# Patient Record
Sex: FEMALE | Race: WHITE | NOT HISPANIC OR LATINO | Employment: UNEMPLOYED | URBAN - METROPOLITAN AREA
[De-identification: names, ages, dates, MRNs, and addresses within clinical notes are randomized per-mention and may not be internally consistent; named-entity substitution may affect disease eponyms.]

---
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
## 2024-01-04 ENCOUNTER — PATIENT MESSAGE (OUTPATIENT)
Dept: PSYCHIATRY | Facility: CLINIC | Age: 19
End: 2024-01-04
Payer: COMMERCIAL

## 2024-01-09 ENCOUNTER — DOCUMENTATION ONLY (OUTPATIENT)
Dept: PSYCHIATRY | Facility: CLINIC | Age: 19
End: 2024-01-09
Payer: COMMERCIAL

## 2024-01-09 NOTE — PROGRESS NOTES
"Patient reported being diagnosed with ADHD since childhood and wants to reevaluated for ADHD. She stated she was diagnosed with ADHD around age 8 or 9 and was treated successfully with a stimulant.     ADHD ROS        A. A persistent pattern of inattention and/or hyperactivity-impulsivity that interferes with functioning  or development, as characterized by (1) and/or (2):  1. Inattention: Six (or more) of the following symptoms have persisted for at least 6 months to a degree that is inconsistent with developmental level and that negatively impacts directly on social and academic/occupational activities:  Note: The symptoms are not solely a manifestation of oppositional behavior, defiance, hostility, or failure to understand tasks or instructions.      For older adolescents and adults (age 17 and older), at least five symptoms are required:      Patient endorses:   a. Often fails to give close attention to details or makes careless mistakes in schoolwork,  at work, or during other activities (e.g., overlooks or misses details, work is inaccurate). Yes. "I make careless mistakes"  b. Often has difficulty sustaining attention in tasks or play activities (e.g., has difficulty remaining  focused during lectures, conversations, or lengthy reading). Yes, "It 's hard to pay attention. I hear the words and I cannot comprehend what they are saying. I have very short attention with people I tend to interrupt people".   c. Often does not seem to listen when spoken to directly (e.g., mind seems elsewhere, even in  the absence of any obvious distraction). Yes, "it is hard for me to what is happening in front of me"  d. Often does not follow through on instructions and fails to finish schoolwork, chores, or duties  in the workplace (e.g., starts tasks but quickly loses focus and is easily sidetracked). Yes, "I get easily sidetracked. I will be asked to do a task and I find other things to do"  e. Often has difficulty organizing " "tasks and activities (e.g., difficulty managing sequential tasks;  difficulty keeping materials and belongings in order; messy, disorganized work; has poor time  management; fails to meet deadlines). Yes, "I have always struggled with all kinds of organizations"  f. Often avoids, dislikes, or is reluctant to engage in tasks that require sustained mental  effort (e.g., schoolwork or homework; for older adolescents and adults, preparing reports,  completing forms, reviewing lengthy papers). Yes, "I usually do things in parts because it takes a lot of mental focus"  g. Often loses things necessary for tasks or activities (e.g., school materials, pencils, books, tools,  wallets, keys, paperwork, eyeglasses, mobile telephones). Yes, "I lose everything. I take my gum out of my mouth and I forget that I put down. I leave things everywhere and I have no clue where I put them."  h. Is often easily distracted by extraneous stimuli (for older adolescents and adults, may include  unrelated thoughts). Yes, "I get distracted with sensory inputs. I get distracted easily with noises. I have over stimulated with sensory things"  i. Is often forgetful in daily activities (e.g., doing chores, running errands; for older adolescents  and adults, returning calls, paying bills, keeping appointments). Yes, " I am suppose to return a book to library and I forget to do it"     2. Hyperactivity and impulsivity: Six (or more) of the following symptoms have persisted for at least 6 months to a degree that is inconsistent with developmental level and that negatively impacts directly on social and academic/occupational activities:     Note: The symptoms are not solely a manifestation of oppositional behavior, defiance, hostility, or a failure to understand tasks or instructions. For older adolescents and adults (age 17 and older), at least five symptoms are required.     Patient endorses:   a. Often fidgets with or taps hands or feet or squirms " in seat. Yes  b. Often leaves seat in situations when remaining seated is expected (e.g., leaves his or her place  in the classroom, in the office or other workplace, or in other situations that require remaining  in place). Yes  c. Often runs about or climbs in situations where it is inappropriate. (Note: In adolescents or  adults, may be limited to feeling restless.) No   d. Often unable to play or engage in leisure activities quietly. Yes  e. Is often on the go, acting as if driven by a motor (e.g., is unable to be or uncomfortable  being still for extended time, as in restaurants, meetings; may be experienced by others as  being restless or difficult to keep up with). No  f. Often talks excessively. Yes  g. Often blurts out an answer before a question has been completed (e.g., completes peoples  sentences; cannot wait for turn in conversation). Yes  h. Often has difficulty waiting his or her turn (e.g., while waiting in line). Yes  i. Often interrupts or intrudes on others (e.g., butts into conversations, games, or activities; may  start using other peoples things without asking or receiving permission; for adolescents and  adults, may intrude into or take over what others are doing). Yes  B. Several inattentive or hyperactive-impulsive symptoms were present prior to age 12 years.  C. Several inattentive or hyperactive-impulsive symptoms are present in two or more settings (e.g., at home, school, or work; with friends or relatives; in other activities).  D. There is clear evidence that the symptoms interfere with, or reduce the quality of, social, academic, or occupational functioning.  E. The symptoms do not occur exclusively during the course of schizophrenia or another psychotic disorder and are not better explained by another mental disorder (e.g., mood disorder, anxiety disorder, dissociative disorder, personality disorder, substance intoxication or withdrawal).     Several inattentive or  "hyperactive-impulsive symptoms were present prior to 12 years: Y    She stated ADHD symptoms have affected her life and stated, "I had a hard time paying attention and not being to sit still in class. It affected my grades in grammar school and I had a lot of cs. I failed math and geography in high school and I had to go to summer school for math and works geography. I was not able to compete tests or turn things on time. I was not able to take notes. As an adult I have a hard time to be on time and finish tasks and it has affected my grades. At home, ADHD affected my relationships with my family that lead to arguments with my family. I struggle with organization, my room is messy, and I forgot to do important tasks at home and all upset my family".    Patient reported attention deficit hyperactivity symptoms of overlooking details, having trouble focusing on meetings and conversations, being distracted during conversations, being disorganized, having trouble prioritizing tasks, avoiding lengthy mental tasks, being easily distracted, forgetfulness, and restless and fidgetiness.  Problems happen at home, the community, and occupationally. These symptoms have been happening over patient's entire life. Patient denied any history of heart palpitations, syncope, dizziness, dyspnea on exertion, shortness of breath, or chest pain. She denies any family history of arrhythmias, enlarged heart, or sudden cardiac death. Her blood pressure is within normal limits. I discussed in detail the risks of stimulants including risk of abuse, tolerance, dependence, insomnia, and anxiety. Patient verbalized understanding.    Patient seems to meet the criteria for ADHD combined type.    Will continue the same treatment adderall 10 mg po daily for ADHD   Patient requested an accommodation ADHD letter    " Alert and oriented to person, place and time

## 2024-04-09 DIAGNOSIS — F39 MOOD DISORDER: ICD-10-CM

## 2024-04-09 RX ORDER — DULOXETIN HYDROCHLORIDE 60 MG/1
60 CAPSULE, DELAYED RELEASE ORAL
Qty: 30 CAPSULE | Refills: 2 | OUTPATIENT
Start: 2024-04-09

## 2024-04-09 RX ORDER — DULOXETIN HYDROCHLORIDE 30 MG/1
CAPSULE, DELAYED RELEASE ORAL
Qty: 90 CAPSULE | Refills: 0 | Status: SHIPPED | OUTPATIENT
Start: 2024-04-09 | End: 2024-05-08 | Stop reason: SDUPTHER

## 2024-05-08 ENCOUNTER — OFFICE VISIT (OUTPATIENT)
Dept: PSYCHIATRY | Facility: CLINIC | Age: 19
End: 2024-05-08
Payer: COMMERCIAL

## 2024-05-08 VITALS
DIASTOLIC BLOOD PRESSURE: 60 MMHG | SYSTOLIC BLOOD PRESSURE: 121 MMHG | BODY MASS INDEX: 33.62 KG/M2 | WEIGHT: 195.88 LBS | HEART RATE: 63 BPM

## 2024-05-08 DIAGNOSIS — F41.1 GENERALIZED ANXIETY DISORDER: ICD-10-CM

## 2024-05-08 DIAGNOSIS — F33.41 RECURRENT MAJOR DEPRESSIVE DISORDER, IN PARTIAL REMISSION: Primary | ICD-10-CM

## 2024-05-08 PROCEDURE — 99214 OFFICE O/P EST MOD 30 MIN: CPT | Mod: S$GLB,,, | Performed by: NURSE PRACTITIONER

## 2024-05-08 PROCEDURE — 99999 PR PBB SHADOW E&M-EST. PATIENT-LVL II: CPT | Mod: PBBFAC,,, | Performed by: NURSE PRACTITIONER

## 2024-05-08 RX ORDER — TRAZODONE HYDROCHLORIDE 50 MG/1
50 TABLET ORAL NIGHTLY PRN
Qty: 90 TABLET | Refills: 1 | Status: SHIPPED | OUTPATIENT
Start: 2024-05-08 | End: 2024-05-08 | Stop reason: SDUPTHER

## 2024-05-08 RX ORDER — DULOXETIN HYDROCHLORIDE 30 MG/1
CAPSULE, DELAYED RELEASE ORAL
Qty: 90 CAPSULE | Refills: 1 | Status: SHIPPED | OUTPATIENT
Start: 2024-05-08

## 2024-05-08 RX ORDER — TRAZODONE HYDROCHLORIDE 50 MG/1
50 TABLET ORAL NIGHTLY PRN
Qty: 90 TABLET | Refills: 1 | Status: SHIPPED | OUTPATIENT
Start: 2024-05-08 | End: 2024-08-06

## 2024-05-08 RX ORDER — DULOXETIN HYDROCHLORIDE 60 MG/1
60 CAPSULE, DELAYED RELEASE ORAL DAILY
Qty: 90 CAPSULE | Refills: 1 | Status: SHIPPED | OUTPATIENT
Start: 2024-05-08 | End: 2024-05-08 | Stop reason: SDUPTHER

## 2024-05-08 RX ORDER — DULOXETIN HYDROCHLORIDE 60 MG/1
60 CAPSULE, DELAYED RELEASE ORAL DAILY
Qty: 90 CAPSULE | Refills: 1 | Status: SHIPPED | OUTPATIENT
Start: 2024-05-08 | End: 2024-08-06

## 2024-05-08 RX ORDER — DULOXETIN HYDROCHLORIDE 30 MG/1
CAPSULE, DELAYED RELEASE ORAL
Qty: 90 CAPSULE | Refills: 1 | Status: SHIPPED | OUTPATIENT
Start: 2024-05-08 | End: 2024-05-08 | Stop reason: SDUPTHER

## 2024-05-08 NOTE — PROGRESS NOTES
Outpatient Psychiatry Follow-Up Visit (MD/NP)    5/8/2024     The patient location is: [place], [place]  The chief complaint leading to consultation is: Anxiety, Depression, OCD, Panic attacks, Eating disorder, Insomnia, & ADHD    Clinical Status of Patient:  Outpatient (Ambulatory)    Chief Complaint:  Leonila Singh is a 19 y.o. adult who presents today for follow-up of depression, anxiety, attention problems and insomnia, panic attacks, OCD, and eating disorder .  Met with patient     Interval History and Content of Current Session:  Interim Events/Subjective Report/Content of Current Session:     Patient presented late for in person to this visit. She stated she has been doing well an stable on her current medication regimen. 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     She stated she gets depressed around her menstrual cycle. She stated she has been taking duloxetine 60 mg and duloxetine 30 mg, total duloxetine 90 mg po daily and finds it effective managing her depression and anxiety.    She rated her anxiety at 6/10 with 10/10 due to journaling work and school but overall managed. She reported improved depression and rated her depressed at 4/10 with 10/10 being the most severe.     She reported good sleep and gets about 6-8 hours of sleep of restful sleep with trazodone 50 mg po qhs prn.  She denied having any suicidal thoughts and no suicide plan or intent.     She reported overall improved mood, motivation, and a sense of enjoyment. She denied feelings of guilt, worthlessness, helplessness, or hopelessness. She reported feeling of shame and working on them with her therapist.     She stated she finds duloxetine 90 mg po daily to be effective in treating her depression and anxiety. She stated she is taking trazodone 50 mg po qhs prn as needed and finds it helpful in treating her insomnia. She stated she feels better due to having family  support.    In her previous visits she stated she finished ketamine treatment and didn't find it helpful in improving her depression. She completed 30 treatments TMS and found it helpful.    She stated she competed DBT, and still sees Dr. Angeline Chaudhry, for therapy and finds it helpful.    When she was asked about alcohol use she stated she stated she drinks about 2-3 drinks once or twice a month. She stated she is working on taking better control of her emotion and maintain stable mood.      We discussed the risks of drinking alcohol use including death from alcohol poisoning. We dicussed medication interaction between alcohol and klonopin that leads to respiratory depression and/or even death.  Patient verbalized understanding.  She is not taking klonopin at this time.    She stated she is not taking adderall at this time.    Encouraged patient to utilize her effective coping skills to manage her anxiety and panic attacks. Patient verbalized understanding of how this is for her best interest.    Patient denied auditory or visual hallucination at this time.    She denied SI/HI/AVH and no objective sings or symptoms of rafa or psychosis.     She reported occasional marijuana use and discussed the risks of marijuana use    Contracted for safety and non suicide plan (contact family, suicide hotline, call 911 or go the nearest emergency room).    Patient gave the writer of this note verbal permission to speak with her mother and will have patient sign DANIKA when she comes to the office in person. Patient provided a verbal agreement to communicate with her mother regarding her mental health. Patient's mother requested # 90 day supply and patient's mother will monitor dispensing.       Psychiatric Review Of Systems - Is patient experiencing or having changes in:  sleep: good  appetite: normal  weight: normal  energy/anergy: normal  interest/pleasure/anhedonia: no  Motivation:  Improving  somatic symptoms:  "no  anxiety/panic: yes but manageable  guilty/hopelessness: no  concentration: no  S.I.B.s/risky behavior: no  Irritability: no  Racing thoughts: no  Impulsive behaviors: no longer impulsive  Paranoia:no  AVH:no  Suicidal thoughts/plan/intent: no  SE: no  Drugs: No  ETOH: no abuse      Information form previous encounter  Obtained and reviewed Dr. Vergara's office notes of previous medication trials as summarized below:  Zoloft 150 mg-"aggression toward parents worsened."  Lamictal- unknown dose Does not remember  Prozac- "worsened mood."  Intuniv 2 mg- no improvement  Celexa 20 mg   Risperdal .25 mg BID  Luvox CR up to 150 mg   Abilify 3 mg caused weight gain of 60 lbs per patient  Concerta- "I hated how I felt like a robot all day long when I took that!"  Contempla  Buspar  Invega 3 mg  Varylar was denied  Adderall XR caused irritability  Latuda (didn't find helpful and it made her hungry"  Topamax  Rexulti 0.5 mg daily due to cost (not covered by her insurance)  Atarax 10 mg po qhs prn for insomnia because she does not like the way she feels while taking it  Melatonin is not effective  Klonopin 0.5 mg po daily prn    Psychotherapy:  Target symptoms: depression, anxiety , PMDD, and sleep problems  Why chosen therapy is appropriate versus another modality: relevant to diagnosis, patient responds to this modality, evidence based practice  Outcome monitoring methods: self-report, observation  Therapeutic intervention type: insight oriented psychotherapy, behavior modifying psychotherapy, supportive psychotherapy  Topics discussed/themes: building skills sets for symptom management, symptom recognition  The patient's response to the intervention is accepting. The patient's progress toward treatment goals is fair.   Duration of intervention: 10 minutes.    Review of Systems   PSYCHIATRIC: Pertinant items are noted in the narrative.  CONSTITUTIONAL: No weight gain or loss.   MUSCULOSKELETAL: No pain or stiffness of the " "joints.  NEUROLOGIC: No weakness, sensory changes, seizures, confusion, memory loss, tremor or other abnormal movements.  ENDOCRINE: No polydipsia or polyuria.  INTEGUMENTARY: No rashes or lacerations.  EYES: No exophthalmos, jaundice or blindness.  ENT: No dizziness, tinnitus or hearing loss.  RESPIRATORY: No shortness of breath.  CARDIOVASCULAR: No tachycardia or chest pain.  GASTROINTESTINAL: No nausea, vomiting, pain, constipation or diarrhea.  GENITOURINARY: No frequency, dysuria or sexual dysfunction.  HEMATOLOGIC/LYMPHATIC: No excessive bleeding, prolonged or excessive bleeding after dental extraction/injury.  ALLERGIC/IMMUNOLOGIC: No allergic response to materials, foods or animals at this time.       Past Medical, Family and Social History: The patient's past medical, family and social history have been reviewed and updated as appropriate within the electronic medical record - see encounter notes.    Compliance: yes    Side effects: None    Risk Parameters:  Patient reports no suicidal ideation  Patient reports no homicidal ideation  Patient reports no self-injurious behavior  Patient reports no violent behavior    Exam (detailed: at least 9 elements; comprehensive: all 15 elements)   Constitutional  Vitals:  Most recent vital signs, dated greater than 90 days prior to this appointment, were reviewed.   There were no vitals filed for this visit.     General:  unremarkable, age appropriate     Musculoskeletal  Muscle Strength/Tone:  not examined   Gait & Station:  non-ataxic     Psychiatric  Speech:  no latency; no press   Mood & Affect:  "Overall okay"  euthymic   Thought Process:  normal and logical   Associations:  intact   Thought Content:  normal, no suicidality, no homicidality, delusions, or paranoia   Insight:  intact, has awareness of illness   Judgement: behavior is adequate to circumstances   Orientation:  grossly intact   Memory: intact for content of interview   Language: grossly intact, able to " name, able to repeat   Attention Span & Concentration:  able to focus   Fund of Knowledge:  intact and appropriate to age and level of education, familiar with aspects of current personal life     Assessment and Diagnosis   Status/Progress: Based on the examination today, the patient's problem(s) is/are improved.  New problems have not been presented today.   Co-morbidities, Diagnostic uncertainty and Lack of compliance are not complicating management of the primary condition.  There are no active rule-out diagnoses for this patient at this time.     General Impression: Stable on her current treatment and medication regimen and requested keeping the same dosages of her medications due to their effectiveness.    MDD recurrent partial remission    2. Anxiety disorder, unspecified type  F41.9 300.00   3. Obsessive-compulsive disorder, unspecified type  F42.9 300.3   4. ADHD (attention deficit hyperactivity disorder), inattentive type  F90.0 314.00         ICD-10-CM ICD-9-CM       Intervention/Counseling/Treatment Plan     Medication Management: Continue current medications. The risks and benefits of medication were discussed with the patient.  -Continue Cymbalta 90 mg daily for anxiety, panic attacks and depression  -Completed TMS and Ketamine treatments by different providers  -Not taking Adderall IR 10 mg daily for ADHD as needed. Not taking now because is not in school  -checked  and no signs on misuse  -Discussed medication and treatment options. Went over the risks, benefits, side effects and alternatives of taking the listed above medication.   -We discussed risk of Serotonin Syndrome including symptoms in order of appearance: diarrhea, restlessness, extreme agitation, autonomic instability, hyperthermia, rigidity, coma, and death.  -Discussed black box warning of increased Suicidal thoughts in individuals younger than 24 years old and the steps to take if patient ever experiences increased SI.   -Contracted  for safety and non suicide plan (contact family, suicide hotline, call 911 or go the nearest emergency room)  -Continue Sleep hygiene and melatonin 3-5 mg po qhs prn for sleep problems  -Continue trazodone 50 mg po qhs prn as an off label for insomnia  -Continue therapy  -Discussed informed consent, diagnosis, risks and benefits of proposed treatment above vs alternative treatments vs no treatment, and potential side effects of these treatments. The patient expresses understanding of the above and displays the capacity to agree with this treatment given said understanding. Patient also agrees that, currently, the benefits outweigh the risks and would like to pursue treatment at this time. Answered all questions and discussed follow up. Encouraged patient to contact us with any questions or concerns.   -Counseled on the risks of alcohol use and discussed SAMSA guidelines of no more than 3 drinks per day and no more than 7 drinks per week for women  -Encouraged Patient to keep future appointments.  -Take medications as prescribed and abstain from substance abuse.  -Patient to present to Emergency Department for thoughts to harm herself or others    Return to Clinic: 1-2 month or earlier as needed     WILMER [place]-BC        Did tell you med did warn to go to [facility]

## 2024-08-12 RX ORDER — DULOXETIN HYDROCHLORIDE 30 MG/1
CAPSULE, DELAYED RELEASE ORAL
Qty: 90 CAPSULE | Refills: 0 | Status: SHIPPED | OUTPATIENT
Start: 2024-08-12

## 2024-08-12 RX ORDER — DULOXETIN HYDROCHLORIDE 60 MG/1
60 CAPSULE, DELAYED RELEASE ORAL DAILY
Qty: 90 CAPSULE | Refills: 0 | Status: SHIPPED | OUTPATIENT
Start: 2024-08-12 | End: 2024-11-10

## 2024-08-29 ENCOUNTER — PATIENT MESSAGE (OUTPATIENT)
Dept: OBSTETRICS AND GYNECOLOGY | Facility: CLINIC | Age: 19
End: 2024-08-29
Payer: COMMERCIAL

## 2024-09-06 ENCOUNTER — HOSPITAL ENCOUNTER (OUTPATIENT)
Dept: RADIOLOGY | Facility: HOSPITAL | Age: 19
Discharge: HOME | End: 2024-09-06
Attending: STUDENT IN AN ORGANIZED HEALTH CARE EDUCATION/TRAINING PROGRAM
Payer: COMMERCIAL

## 2024-09-06 ENCOUNTER — PATIENT MESSAGE (OUTPATIENT)
Dept: PAIN MEDICINE | Facility: CLINIC | Age: 19
End: 2024-09-06

## 2024-09-06 ENCOUNTER — OFFICE VISIT (OUTPATIENT)
Dept: PAIN MEDICINE | Facility: CLINIC | Age: 19
End: 2024-09-06
Payer: COMMERCIAL

## 2024-09-06 VITALS
BODY MASS INDEX: 32.2 KG/M2 | SYSTOLIC BLOOD PRESSURE: 97 MMHG | HEART RATE: 53 BPM | DIASTOLIC BLOOD PRESSURE: 50 MMHG | HEIGHT: 64 IN | WEIGHT: 188.63 LBS

## 2024-09-06 DIAGNOSIS — M54.2 CERVICALGIA: ICD-10-CM

## 2024-09-06 DIAGNOSIS — Z75.8 DOES NOT HAVE PRIMARY CARE PROVIDER: ICD-10-CM

## 2024-09-06 DIAGNOSIS — M54.9 DORSALGIA: ICD-10-CM

## 2024-09-06 DIAGNOSIS — M79.18 MYOFASCIAL PAIN: ICD-10-CM

## 2024-09-06 DIAGNOSIS — M25.50 GENERALIZED JOINT PAIN: Primary | ICD-10-CM

## 2024-09-06 PROCEDURE — 72040 X-RAY EXAM NECK SPINE 2-3 VW: CPT | Mod: 26,,, | Performed by: RADIOLOGY

## 2024-09-06 PROCEDURE — 72100 X-RAY EXAM L-S SPINE 2/3 VWS: CPT | Mod: 26,,, | Performed by: RADIOLOGY

## 2024-09-06 PROCEDURE — 99999 PR PBB SHADOW E&M-EST. PATIENT-LVL IV: CPT | Mod: PBBFAC,,, | Performed by: STUDENT IN AN ORGANIZED HEALTH CARE EDUCATION/TRAINING PROGRAM

## 2024-09-06 PROCEDURE — 72040 X-RAY EXAM NECK SPINE 2-3 VW: CPT | Mod: TC,FY

## 2024-09-06 PROCEDURE — 72100 X-RAY EXAM L-S SPINE 2/3 VWS: CPT | Mod: TC,FY

## 2024-09-06 NOTE — PROGRESS NOTES
[facility] - New Patient Evaluation    Referred by: Sahbana Self   Reason for referral: Generalized joint pain     CC:   Chief Complaint   Patient presents with    Low-back Pain    Knee Pain    Neck Pain    Hand Pain         9/6/2024     2:53 PM   Last 3 PDI Scores   Pain Disability Index (PDI) 32       Subjective 09/06/2024:   Leonila Singh is a 19 y.o. female who  has a past medical history of ADHD, Depression, and OCD (obsessive compulsive disorder) and presents complaining of back, neck and generalized joint pain.  She reports the pain waxes and wanes but is always there.  She notes intermittent swelling in the knees, hands and feet throughout the day.  She denies any history of inflammatory arthritis.  Denies rashes.  No known family history of inflammatory arthritis.  Denies any food allergies, celiac disease, or food sensitivities.  She does report a history of eating disorder in the past.    Initial Pain Assessment:  Location: back, neck, knees, wrists, fingers  Onset: years  Current Pain Score: 7/10  Daily Pain of Range: 8-9/10  Quality: Aching, Throbbing, Tight, Deep, and Sharp  Radiation: throughout the body  Worsened by: exercise, standing for more than several minutes, walking for more than several minutes, and daily activity   Improved by: heat and medications     Patient denies night fever/night sweats, urinary incontinence, bowel incontinence, significant weight loss, significant motor weakness, and loss of sensations.      Previous Interventions:  - none    Previous Therapies:  PT/OT: no   Chiropractor:   HEP:   Relevant Surgery: no     Previous Medications:   - Tylenol or NSAIDS:  Ibuprofen, Tylenol  - Muscle Relaxants:    - TCAs:   - SNRIs: Cymbalta   - Topicals:   - Anticonvulsants:    - Opioids:   - Adjuvants:     Current Pain Medications:  Cymbalta   Ibuprofen, Tylenol p.r.n.    Review of Systems:  ROS    GENERAL:  No weight loss, malaise or  fevers.  HEENT:   No recent changes in vision or hearing  NECK:  No difficulty with swallowing. No stridor.   RESPIRATORY:  Negative for cough, wheezing or shortness of breath, patient denies any recent URI.  CARDIOVASCULAR:  Negative for chest pain, leg swelling or palpitations.  GI:  Negative for abdominal discomfort, blood in stools or black stools or change in bowel habits.  MUSCULOSKELETAL:  See HPI.  SKIN:  Negative for lesions, rash, and itching.  PSYCH:  No mood disorder or recent psychosocial stressors.    HEMATOLOGY/LYMPHOLOGY:  Negative for prolonged bleeding, bruising easily or swollen nodes.  Patient is not currently taking any anti-coagulants  NEURO:   No history of headaches, syncope, paralysis, seizures or tremors.  All other reviewed and negative other than HPI.    History:  Current medications, allergies, medical history, surgical history,   family history, and social history were reviewed in the chart as marked.    Full Medication List:    Current Outpatient Medications:     dextroamphetamine-amphetamine (ADDERALL XR) 10 MG 24 hr capsule, Take one tab po qam and one tab po qnoon, Disp: 45 capsule, Rfl: 0    DULoxetine (CYMBALTA) 30 MG capsule, Take one cap of duloxetine 30 mg po daily plus one cap of duloxetine 60 mg po daily, total 90 mg po daily, Disp: 90 capsule, Rfl: 0    DULoxetine (CYMBALTA) 60 MG capsule, Take 1 capsule (60 mg total) by mouth once daily. Take one cap of duloxetine 30 mg po daily plus one cap of duloxetine 60 mg po daily, total 90 mg po daily, Disp: 90 capsule, Rfl: 0    leuprolide (LUPRON) 3.75 mg injection, Inject 3.75 mg into the muscle every 28 days., Disp: 1 kit, Rfl: 12    drospirenone, contraceptive, (SLYND) 4 mg (28) Tab, Take 1 tablet (4 mg total) by mouth once daily at 6am., Disp: 28 tablet, Rfl: 11    norethindrone-ethinyl estradiol (JUNEL FE 1/20) 1 mg-20 mcg (21)/75 mg (7) per tablet, Take 1 tablet by mouth once daily., Disp: 90 tablet, Rfl: 3    traZODone  "(DESYREL) 50 MG tablet, Take 1 tablet (50 mg total) by mouth nightly as needed for Insomnia., Disp: 90 tablet, Rfl: 1     Allergies:  Patient has no known allergies.     Medical History:   has a past medical history of ADHD, Depression, and OCD (obsessive compulsive disorder).    Surgical History:   has a past surgical history that includes Tonsillectomy; Esophagogastroduodenoscopy (N/A, 11/24/2020); and Colonoscopy (N/A, 11/24/2020).    Family History:  family history includes Diabetes in her paternal grandmother; No Known Problems in her brother, father, mother, and sister.    Social History:   reports that she has never smoked. She has never used smokeless tobacco. She reports that she does not currently use alcohol. She reports that she does not use drugs.    Physical Exam:  Vitals:    09/06/24 1459   BP: (!) 97/50   Pulse: (!) 53   Weight: 85.6 kg (188 lb 9.7 oz)   Height: 5' 4" (1.626 m)   PainSc:   7   PainLoc: Back       GENERAL: Well appearing, in no acute distress, alert and oriented x3.  PSYCH:  Mood and affect appropriate.  SKIN: Skin color, texture, turgor normal, no rashes or lesions.  HEAD/FACE:  Normocephalic, atraumatic. Cranial nerves grossly intact.  NECK: Normal ROM. Supple. Mild pain to palpation over the cervical paraspinous muscles. Spurling Negative.  Facet loading negative.    CV: RRR with palpation of the radial artery.  PULM: No evidence of respiratory difficulty, symmetric chest rise.  GI:  Soft and non-distended.  MSK: Straight leg raising is negative to radicular pain. + mild pain with palpation of the bilateral paraspinal muscles of the thoracic and lumbar spine.  No pain with lumbar facet loading. No pain over the SI joints.  GABI test is negative.  No pain over the GTB bilaterally.  Normal range of motion of the lumbar spine.  Peripheral joint ROM is full and pain free without obvious instability or laxity in all four extremities. No obvious deformities, joint edema, or skin " discoloration notes.  No atrophy or tone abnormalities are noted.   NEURO: Bilateral upper and lower extremity coordination and strength is symmetric.  No loss of sensation is noted.  MENTAL STATUS: A x O x 3, good concentration, speech is fluent and goal directed  MOTOR: 5/5 in all muscle groups  GAIT: Normal. Ambulates unassisted.    Imaging:  No pertinent imaging available.     Labs:  BMP  Lab Results   Component Value Date     09/19/2022    K 4.2 09/19/2022     09/19/2022    CO2 23 09/19/2022    BUN 11.3 09/19/2022    CREATININE 0.69 09/19/2022    CALCIUM 9.8 09/19/2022    ANIONGAP 11 06/25/2021    EGFRNORACEVR >60 09/19/2022     Lab Results   Component Value Date    ALT 19 09/19/2022    AST 15 09/19/2022    ALKPHOS 109 09/19/2022    BILITOT 0.3 09/19/2022     Lab Results   Component Value Date    WBC 5.4 09/19/2022    HGB 14.4 09/19/2022    HCT 43.9 09/19/2022    MCV 86.1 09/19/2022     09/19/2022           Assessment:  Problem List Items Addressed This Visit    None  Visit Diagnoses       Generalized joint pain    -  Primary    Relevant Orders    Sedimentation rate    C-Reactive Protein    ROZ    Ambulatory referral/consult to [facility]    Ambulatory referral/consult to Rheumatology    Cervicalgia        Relevant Orders    X-Ray Cervical Spine AP And Lateral    Ambulatory referral/consult to [facility]    Dorsalgia        Relevant Orders    X-Ray Lumbar Spine AP And Lateral    Ambulatory referral/consult to [facility]    Does not have primary care provider        Relevant Orders    Ambulatory referral/consult to Internal Medicine    Ambulatory referral/consult to Family Practice    Myofascial pain                09/06/2024 - Leonila Singh is a 19 y.o. female who  has a past medical history of ADHD, Depression, and OCD (obsessive compulsive disorder).  By history and examination this patient has chronic generalized body and  joint pain including neck, back, knees, wrists, fingers.  No known personal or family history of autoimmune disease, psoriatic arthritis, rheumatoid arthritis, ankylosing spondylitis, celiac disease, etc. patient does say she thinks maybe her grandparents may have had some type of arthritis problems.  At this time I am recommending the functional restoration program.  I will obtain x-rays of the neck and low back.  I will order CRP, ESR, and ROZ labs.  I will place a referral to Rheumatology to assist with making a diagnosis.  I will provide a referral to family Medicine and Internal Medicine to establish with a PCP.  Differential includes fibromyalgia versus inflammatory arthritis.      Treatment Plan:   Procedures:  None indicated at this time  PT/OT/HEP: I have stressed the importance of physical activity and a home exercise plan to help with pain and improve health.  Referral placed to functional restoration program.   Medications:    - continue with duloxetine as prescribed   - Tylenol and over-the-counter NSAIDs p.r.n.   -  Reviewed and consistent with medication use as prescribed.  Imaging:  Cervical spine and lumbar spine x-rays ordered.  Labs:  ROZ, CRP, ESR ordered  Referral placed to family Medicine and Internal Medicine to establish with a PCP  Referral placed to Rheumatology to assist with diagnosis  Follow Up: RTC 4 weeks    Josi Villela DO   Interventional Pain Medicine / Anesthesiology    Disclaimer: This note was partly generated using dictation software which may occasionally result in transcription errors.

## 2024-09-10 ENCOUNTER — TELEPHONE (OUTPATIENT)
Dept: ADMINISTRATIVE | Facility: OTHER | Age: 19
End: 2024-09-10
Payer: COMMERCIAL

## 2024-09-10 NOTE — TELEPHONE ENCOUNTER
Left voice message for patient to return call to schedule appointment from referral to Functional Restoration Referral. Contact number provided, and My Chart message sent.  Marianne PATRICK [phone]

## 2024-09-30 ENCOUNTER — DOCUMENTATION ONLY (OUTPATIENT)
Dept: PSYCHIATRY | Facility: CLINIC | Age: 19
End: 2024-09-30
Payer: COMMERCIAL

## 2024-09-30 ENCOUNTER — PATIENT MESSAGE (OUTPATIENT)
Dept: PSYCHIATRY | Facility: CLINIC | Age: 19
End: 2024-09-30

## 2024-09-30 NOTE — PROGRESS NOTES
Called pt when she had not logged into virtual.   It went straight to . I also sent a message in [place] hoping she could still log in.

## 2024-10-21 ENCOUNTER — OFFICE VISIT (OUTPATIENT)
Dept: PSYCHIATRY | Facility: CLINIC | Age: 19
End: 2024-10-21
Payer: COMMERCIAL

## 2024-10-21 ENCOUNTER — TELEPHONE (OUTPATIENT)
Dept: PSYCHIATRY | Facility: CLINIC | Age: 19
End: 2024-10-21
Payer: COMMERCIAL

## 2024-10-21 DIAGNOSIS — F33.9 MAJOR DEPRESSION, RECURRENT, CHRONIC: ICD-10-CM

## 2024-10-21 DIAGNOSIS — F90.0 ATTENTION DEFICIT HYPERACTIVITY DISORDER (ADHD), PREDOMINANTLY INATTENTIVE TYPE: ICD-10-CM

## 2024-10-21 DIAGNOSIS — G47.00 INSOMNIA, UNSPECIFIED TYPE: ICD-10-CM

## 2024-10-21 DIAGNOSIS — F39 MOOD DISORDER: ICD-10-CM

## 2024-10-21 DIAGNOSIS — F32.81 PMDD (PREMENSTRUAL DYSPHORIC DISORDER): ICD-10-CM

## 2024-10-21 DIAGNOSIS — F41.1 GENERALIZED ANXIETY DISORDER: ICD-10-CM

## 2024-10-21 DIAGNOSIS — F42.9 OBSESSIVE-COMPULSIVE DISORDER, UNSPECIFIED TYPE: ICD-10-CM

## 2024-10-21 DIAGNOSIS — F43.10 PTSD (POST-TRAUMATIC STRESS DISORDER): Primary | ICD-10-CM

## 2024-10-21 PROCEDURE — 90791 PSYCH DIAGNOSTIC EVALUATION: CPT | Mod: 95,,,

## 2024-10-21 NOTE — PATIENT INSTRUCTIONS
1) Make appt with Yuridia Majano, NP -[facility] using [facility] or by calling Primary Care.    You need someone to be coordinating your care  2) Make an appt with Rheumatology (it might take awhile).   2) Made a plan to Start EMDR with me.   3) I will follow up about 2nd opinion for Psych meds
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
## 2024-10-28 ENCOUNTER — OFFICE VISIT (OUTPATIENT)
Dept: PSYCHIATRY | Facility: CLINIC | Age: 19
End: 2024-10-28
Payer: COMMERCIAL

## 2024-10-28 DIAGNOSIS — F33.9 MAJOR DEPRESSION, RECURRENT, CHRONIC: ICD-10-CM

## 2024-10-28 DIAGNOSIS — F39 MOOD DISORDER: ICD-10-CM

## 2024-10-28 DIAGNOSIS — F90.0 ATTENTION DEFICIT HYPERACTIVITY DISORDER (ADHD), PREDOMINANTLY INATTENTIVE TYPE: ICD-10-CM

## 2024-10-28 DIAGNOSIS — F41.1 GENERALIZED ANXIETY DISORDER: ICD-10-CM

## 2024-10-28 DIAGNOSIS — F32.81 PMDD (PREMENSTRUAL DYSPHORIC DISORDER): ICD-10-CM

## 2024-10-28 DIAGNOSIS — G47.00 INSOMNIA, UNSPECIFIED TYPE: ICD-10-CM

## 2024-10-28 DIAGNOSIS — F43.10 PTSD (POST-TRAUMATIC STRESS DISORDER): Primary | ICD-10-CM

## 2024-10-28 PROCEDURE — 90837 PSYTX W PT 60 MINUTES: CPT | Mod: 95,,,

## 2024-10-28 PROCEDURE — 90785 PSYTX COMPLEX INTERACTIVE: CPT | Mod: 95,,,

## 2024-11-04 ENCOUNTER — OFFICE VISIT (OUTPATIENT)
Dept: PSYCHIATRY | Facility: CLINIC | Age: 19
End: 2024-11-04
Payer: COMMERCIAL

## 2024-11-04 ENCOUNTER — HOSPITAL ENCOUNTER (EMERGENCY)
Facility: HOSPITAL | Age: 19
Discharge: PSYCHIATRIC OR SUBSTANCE USE FACILITY | End: 2024-11-04
Attending: EMERGENCY MEDICINE
Payer: COMMERCIAL

## 2024-11-04 VITALS
HEART RATE: 60 BPM | OXYGEN SATURATION: 100 % | TEMPERATURE: 98 F | RESPIRATION RATE: 15 BRPM | SYSTOLIC BLOOD PRESSURE: 115 MMHG | WEIGHT: 180.56 LBS | DIASTOLIC BLOOD PRESSURE: 66 MMHG | BODY MASS INDEX: 30.99 KG/M2

## 2024-11-04 DIAGNOSIS — F33.9 MAJOR DEPRESSION, RECURRENT, CHRONIC: Primary | ICD-10-CM

## 2024-11-04 DIAGNOSIS — F39 MOOD DISORDER: ICD-10-CM

## 2024-11-04 DIAGNOSIS — G47.00 INSOMNIA, UNSPECIFIED TYPE: ICD-10-CM

## 2024-11-04 DIAGNOSIS — F41.1 GENERALIZED ANXIETY DISORDER: ICD-10-CM

## 2024-11-04 DIAGNOSIS — F90.0 ATTENTION DEFICIT HYPERACTIVITY DISORDER (ADHD), PREDOMINANTLY INATTENTIVE TYPE: ICD-10-CM

## 2024-11-04 DIAGNOSIS — K59.00 CONSTIPATION, UNSPECIFIED CONSTIPATION TYPE: ICD-10-CM

## 2024-11-04 DIAGNOSIS — Z00.8 MEDICAL CLEARANCE FOR PSYCHIATRIC ADMISSION: Primary | ICD-10-CM

## 2024-11-04 DIAGNOSIS — N30.00 ACUTE CYSTITIS WITHOUT HEMATURIA: ICD-10-CM

## 2024-11-04 DIAGNOSIS — F43.10 PTSD (POST-TRAUMATIC STRESS DISORDER): ICD-10-CM

## 2024-11-04 DIAGNOSIS — F32.81 PMDD (PREMENSTRUAL DYSPHORIC DISORDER): ICD-10-CM

## 2024-11-04 LAB
ALBUMIN SERPL BCP-MCNC: 4.2 G/DL (ref 3.5–5.2)
ALP SERPL-CCNC: 72 U/L (ref 40–150)
ALT SERPL W/O P-5'-P-CCNC: 14 U/L (ref 10–44)
AMPHET+METHAMPHET UR QL: NEGATIVE
ANION GAP SERPL CALC-SCNC: 13 MMOL/L (ref 8–16)
APAP SERPL-MCNC: <3 UG/ML (ref 10–20)
AST SERPL-CCNC: 15 U/L (ref 10–40)
BACTERIA #/AREA URNS HPF: ABNORMAL /HPF
BARBITURATES UR QL SCN>200 NG/ML: NEGATIVE
BASOPHILS # BLD AUTO: 0.03 K/UL (ref 0–0.2)
BASOPHILS NFR BLD: 0.6 % (ref 0–1.9)
BENZODIAZ UR QL SCN>200 NG/ML: NEGATIVE
BILIRUB SERPL-MCNC: 0.3 MG/DL (ref 0.1–1)
BILIRUB UR QL STRIP: NEGATIVE
BUN SERPL-MCNC: 13 MG/DL (ref 6–20)
BZE UR QL SCN: NEGATIVE
CALCIUM SERPL-MCNC: 9.1 MG/DL (ref 8.7–10.5)
CANNABINOIDS UR QL SCN: ABNORMAL
CHLORIDE SERPL-SCNC: 107 MMOL/L (ref 95–110)
CLARITY UR: CLEAR
CO2 SERPL-SCNC: 20 MMOL/L (ref 23–29)
COLOR UR: COLORLESS
CREAT SERPL-MCNC: 0.8 MG/DL (ref 0.5–1.4)
CREAT UR-MCNC: 47.1 MG/DL (ref 15–325)
DIFFERENTIAL METHOD BLD: ABNORMAL
EOSINOPHIL # BLD AUTO: 0.2 K/UL (ref 0–0.5)
EOSINOPHIL NFR BLD: 3.5 % (ref 0–8)
ERYTHROCYTE [DISTWIDTH] IN BLOOD BY AUTOMATED COUNT: 13.2 % (ref 11.5–14.5)
EST. GFR  (NO RACE VARIABLE): >60 ML/MIN/1.73 M^2
ETHANOL SERPL-MCNC: <10 MG/DL
GLUCOSE SERPL-MCNC: 102 MG/DL (ref 70–110)
GLUCOSE UR QL STRIP: NEGATIVE
HCT VFR BLD AUTO: 39.5 % (ref 37–48.5)
HGB BLD-MCNC: 12.6 G/DL (ref 12–16)
HGB UR QL STRIP: ABNORMAL
IMM GRANULOCYTES # BLD AUTO: 0.02 K/UL (ref 0–0.04)
IMM GRANULOCYTES NFR BLD AUTO: 0.4 % (ref 0–0.5)
KETONES UR QL STRIP: NEGATIVE
LEUKOCYTE ESTERASE UR QL STRIP: ABNORMAL
LYMPHOCYTES # BLD AUTO: 1.4 K/UL (ref 1–4.8)
LYMPHOCYTES NFR BLD: 26.5 % (ref 18–48)
MCH RBC QN AUTO: 27.6 PG (ref 27–31)
MCHC RBC AUTO-ENTMCNC: 31.9 G/DL (ref 32–36)
MCV RBC AUTO: 86 FL (ref 82–98)
METHADONE UR QL SCN>300 NG/ML: NEGATIVE
MICROSCOPIC COMMENT: ABNORMAL
MONOCYTES # BLD AUTO: 0.4 K/UL (ref 0.3–1)
MONOCYTES NFR BLD: 6.8 % (ref 4–15)
NEUTROPHILS # BLD AUTO: 3.2 K/UL (ref 1.8–7.7)
NEUTROPHILS NFR BLD: 62.2 % (ref 38–73)
NITRITE UR QL STRIP: NEGATIVE
NRBC BLD-RTO: 0 /100 WBC
OPIATES UR QL SCN: NEGATIVE
PCP UR QL SCN>25 NG/ML: NEGATIVE
PH UR STRIP: 6 [PH] (ref 5–8)
PLATELET # BLD AUTO: 269 K/UL (ref 150–450)
PMV BLD AUTO: 9.1 FL (ref 9.2–12.9)
POTASSIUM SERPL-SCNC: 4.1 MMOL/L (ref 3.5–5.1)
PROT SERPL-MCNC: 7.2 G/DL (ref 6–8.4)
PROT UR QL STRIP: NEGATIVE
RBC # BLD AUTO: 4.57 M/UL (ref 4–5.4)
RBC #/AREA URNS HPF: 0 /HPF (ref 0–4)
SALICYLATES SERPL-MCNC: <5 MG/DL (ref 15–30)
SARS-COV-2 RDRP RESP QL NAA+PROBE: NEGATIVE
SODIUM SERPL-SCNC: 140 MMOL/L (ref 136–145)
SP GR UR STRIP: 1.01 (ref 1–1.03)
TOXICOLOGY INFORMATION: ABNORMAL
TSH SERPL DL<=0.005 MIU/L-ACNC: 1.3 UIU/ML (ref 0.4–4)
URN SPEC COLLECT METH UR: ABNORMAL
UROBILINOGEN UR STRIP-ACNC: NEGATIVE EU/DL
WBC # BLD AUTO: 5.13 K/UL (ref 3.9–12.7)
WBC #/AREA URNS HPF: 11 /HPF (ref 0–5)

## 2024-11-04 PROCEDURE — 90839 PSYTX CRISIS INITIAL 60 MIN: CPT | Mod: 95,,,

## 2024-11-04 PROCEDURE — 80307 DRUG TEST PRSMV CHEM ANLYZR: CPT | Performed by: EMERGENCY MEDICINE

## 2024-11-04 PROCEDURE — 87186 SC STD MICRODIL/AGAR DIL: CPT | Performed by: EMERGENCY MEDICINE

## 2024-11-04 PROCEDURE — 87635 SARS-COV-2 COVID-19 AMP PRB: CPT | Performed by: EMERGENCY MEDICINE

## 2024-11-04 PROCEDURE — 84443 ASSAY THYROID STIM HORMONE: CPT | Performed by: EMERGENCY MEDICINE

## 2024-11-04 PROCEDURE — 80143 DRUG ASSAY ACETAMINOPHEN: CPT | Performed by: EMERGENCY MEDICINE

## 2024-11-04 PROCEDURE — 80179 DRUG ASSAY SALICYLATE: CPT | Performed by: EMERGENCY MEDICINE

## 2024-11-04 PROCEDURE — 85025 COMPLETE CBC W/AUTO DIFF WBC: CPT | Performed by: EMERGENCY MEDICINE

## 2024-11-04 PROCEDURE — 82077 ASSAY SPEC XCP UR&BREATH IA: CPT | Performed by: EMERGENCY MEDICINE

## 2024-11-04 PROCEDURE — 90840 PSYTX CRISIS EA ADDL 30 MIN: CPT | Mod: 95,,,

## 2024-11-04 PROCEDURE — 87086 URINE CULTURE/COLONY COUNT: CPT | Performed by: EMERGENCY MEDICINE

## 2024-11-04 PROCEDURE — 25000003 PHARM REV CODE 250: Performed by: EMERGENCY MEDICINE

## 2024-11-04 PROCEDURE — 87088 URINE BACTERIA CULTURE: CPT | Performed by: EMERGENCY MEDICINE

## 2024-11-04 PROCEDURE — 81000 URINALYSIS NONAUTO W/SCOPE: CPT | Mod: 59 | Performed by: EMERGENCY MEDICINE

## 2024-11-04 PROCEDURE — 99285 EMERGENCY DEPT VISIT HI MDM: CPT

## 2024-11-04 PROCEDURE — 80053 COMPREHEN METABOLIC PANEL: CPT | Performed by: EMERGENCY MEDICINE

## 2024-11-04 RX ORDER — POLYETHYLENE GLYCOL 3350 17 G/17G
17 POWDER, FOR SOLUTION ORAL ONCE
Status: COMPLETED | OUTPATIENT
Start: 2024-11-04 | End: 2024-11-04

## 2024-11-04 RX ORDER — POLYETHYLENE GLYCOL 3350 17 G/17G
17 POWDER, FOR SOLUTION ORAL DAILY
Qty: 116 G | Refills: 0 | Status: SHIPPED | OUTPATIENT
Start: 2024-11-04

## 2024-11-04 RX ORDER — CEFDINIR 300 MG/1
300 CAPSULE ORAL 2 TIMES DAILY
Qty: 14 CAPSULE | Refills: 0 | Status: ON HOLD | OUTPATIENT
Start: 2024-11-04 | End: 2024-11-09 | Stop reason: HOSPADM

## 2024-11-04 RX ORDER — CEFDINIR 300 MG/1
300 CAPSULE ORAL ONCE
Status: COMPLETED | OUTPATIENT
Start: 2024-11-04 | End: 2024-11-04

## 2024-11-04 RX ADMIN — CEFDINIR 300 MG: 300 CAPSULE ORAL at 06:11

## 2024-11-04 RX ADMIN — POLYETHYLENE GLYCOL 3350 17 G: 17 POWDER, FOR SOLUTION ORAL at 06:11

## 2024-11-04 NOTE — ED NOTES
Pt belongings:  Panties, shorts, shirt, bra, two black socks, two shoes, tampons, keychain with multiple keys and Enmanuel fob, water bottle, ponytail hurley  Vera Diony bag:  notebook, brush, multiple pants, multiple shirts, bra, multiple bras, multiple panties, multiple socks, pens, pencil, baggie containing multiple tylenol capsules and tablets, tampons, $1, prescription bottle labeled duloxetine containing capsules, bottle labeled stool softener containing softgels, prescription bottle labeled trazodone containing white tablets, bottle labeled pamprin containing pink tablets, deodorant, facial cleanser x 2, toothpaste, lotion, qtips, lotion x 2, cotton wipes, melatonin spray, aczone gel, makeup bag (containing makeup items, earring, and $0.05)

## 2024-11-04 NOTE — ED PROVIDER NOTES
SCRIBE #1 NOTE: I, Ciara Mckay, am scribing for, and in the presence of, Gabriela Jack DO. I have scribed the entire note.       History     Chief Complaint   Patient presents with    Suicidal     Sent by therapist for SI. C/o .      Review of patient's allergies indicates:  No Known Allergies      History of Present Illness     HPI    11/4/2024, 1:35 PM  History obtained from the patient      History of Present Illness: Leonila Singh is a 19 y.o. female patient with a PMHx of ADHD, depression, obsessive compulsive disorder who presents to the Emergency Department for evaluation of suicidal ideation. Patient reports she originally lived in [place] but due to family issues she has relocated recently and is living with a friend. Patient had a virtual psychiatry appointment today in which her therapist believed she needed further assistance.Patient reports she has hallucinations and has had several suicide attempts. When asked the patient reports she used pills in past attempts and today she did not have a thoughtful plan on committing suicide just the thought itself. Patient is also reporting joint pain. Symptoms are constant and moderate in severity. No mitigating or exacerbating factors reported. Associated sxs include joint pain and visual hallucination. Patient denies any N/V/D, chest pain, HA, lightheadedness, and all other sxs at this time. No further complaints or concerns at this time.       Arrival mode: Personal vehicle    PCP: Sahil Briones        Past Medical History:  Past Medical History:   Diagnosis Date    ADHD     Depression     OCD (obsessive compulsive disorder)        Past Surgical History:  Past Surgical History:   Procedure Laterality Date    COLONOSCOPY N/A 11/24/2020    Procedure: COLONOSCOPY;  Surgeon: Estrella Smith MD;  Location: [address];  Service: Endoscopy;  Laterality: N/A;    ESOPHAGOGASTRODUODENOSCOPY N/A 11/24/2020    Procedure: (EGD);  Surgeon:  Estrella Smith MD;  Location: [facility] ([address] [place] [address]);  Service: Endoscopy;  Laterality: N/A;  covid test 11/21    TONSILLECTOMY           Family History:  Family History   Problem Relation Name Age of Onset    No Known Problems Mother      No Known Problems Father      No Known Problems Sister      No Known Problems Brother      Diabetes Paternal Grandmother         Social History:  Social History     Tobacco Use    Smoking status: Never    Smokeless tobacco: Never   Substance and Sexual Activity    Alcohol use: Not Currently    Drug use: Never    Sexual activity: Never     Birth control/protection: None        Review of Systems     Review of Systems   Cardiovascular:  Negative for chest pain.   Gastrointestinal:  Negative for diarrhea, nausea and vomiting.   Musculoskeletal:         (+) Joint pain   Neurological:  Negative for light-headedness and headaches.   Psychiatric/Behavioral:  Positive for hallucinations and suicidal ideas.    All other systems reviewed and are negative.       Physical Exam     Initial Vitals [11/04/24 1312]   BP Pulse Resp Temp SpO2   125/78 (!) 51 16 97.9 °F (36.6 °C) 97 %      MAP       --          Physical Exam  Nursing Notes and Vital Signs Reviewed.  Constitutional: Patient is in no apparent distress. Well-developed and well-nourished.  Head: Atraumatic. Normocephalic.  Eyes: PERRL. EOM intact. No scleral icterus.  ENT: Mucous membranes are moist.   Neck: Supple. Full ROM.   Cardiovascular: Regular rate. Regular rhythm. No murmurs, rubs, or gallops. Pulmonary/Chest: No respiratory distress. Clear to auscultation bilaterally. No wheezing or rales.  Abdominal: Soft and non-distended.  There is no tenderness.  No rebound, guarding, or rigidity.   Musculoskeletal: Moves all extremities. No obvious deformities. No edema.   Skin: Warm and dry.  Neurological:  Alert, awake, and appropriate.  Normal speech.  No acute focal neurological deficits are appreciated.  Psychiatric:  Sad affect.   Depressed mood.  Poor eye contact.     ED Course   Procedures  ED Vital Signs:  Vitals:    11/04/24 1312   BP: 125/78   Pulse: (!) 51   Resp: 16   Temp: 97.9 °F (36.6 °C)   TempSrc: Oral   SpO2: 97%   Weight: 81.9 kg (180 lb 8.9 oz)       Abnormal Lab Results:  Labs Reviewed   CBC W/ AUTO DIFFERENTIAL - Abnormal       Result Value    WBC 5.13      RBC 4.57      Hemoglobin 12.6      Hematocrit 39.5      MCV 86      MCH 27.6      MCHC 31.9 (*)     RDW 13.2      Platelets 269      MPV 9.1 (*)     Immature Granulocytes 0.4      Gran # (ANC) 3.2      Immature Grans (Abs) 0.02      Lymph # 1.4      Mono # 0.4      Eos # 0.2      Baso # 0.03      nRBC 0      Gran % 62.2      Lymph % 26.5      Mono % 6.8      Eosinophil % 3.5      Basophil % 0.6      Differential Method Automated     COMPREHENSIVE METABOLIC PANEL - Abnormal    Sodium 140      Potassium 4.1      Chloride 107      CO2 20 (*)     Glucose 102      BUN 13      Creatinine 0.8      Calcium 9.1      Total Protein 7.2      Albumin 4.2      Total Bilirubin 0.3      Alkaline Phosphatase 72      AST 15      ALT 14      eGFR >60      Anion Gap 13     URINALYSIS, REFLEX TO URINE CULTURE - Abnormal    Specimen UA Urine, Clean Catch      Color, UA Colorless (*)     Appearance, UA Clear      pH, UA 6.0      Specific Gravity, UA 1.010      Protein, UA Negative      Glucose, UA Negative      Ketones, UA Negative      Bilirubin (UA) Negative      Occult Blood UA 2+ (*)     Nitrite, UA Negative      Urobilinogen, UA Negative      Leukocytes, UA 1+ (*)     Narrative:     Specimen Source->Urine   DRUG SCREEN PANEL, URINE EMERGENCY - Abnormal    Benzodiazepines Negative      Methadone metabolites Negative      Cocaine (Metab.) Negative      Opiate Scrn, Ur Negative      Barbiturate Screen, Ur Negative      Amphetamine Screen, Ur Negative      THC Presumptive Positive (*)     Phencyclidine Negative      Creatinine, Urine 47.1      Toxicology Information SEE COMMENT      Narrative:      Specimen Source->Urine   ACETAMINOPHEN LEVEL - Abnormal    Acetaminophen (Tylenol), Serum <3.0 (*)    SALICYLATE LEVEL - Abnormal    Salicylate Lvl <5.0 (*)    URINALYSIS MICROSCOPIC - Abnormal    RBC, UA 0      WBC, UA 11 (*)     Bacteria Many (*)     Microscopic Comment SEE COMMENT      Narrative:     Specimen Source->Urine   CULTURE, URINE   TSH    TSH 1.298     ALCOHOL,MEDICAL (ETHANOL)    Alcohol, Serum <10     SARS-COV-2 RNA AMPLIFICATION, QUAL    SARS-CoV-2 RNA, Amplification, Qual Negative          All Lab Results:  Results for orders placed or performed during the hospital encounter of 11/04/24   CBC auto differential    Collection Time: 11/04/24  2:16 PM   Result Value Ref Range    WBC 5.13 3.90 - 12.70 K/uL    RBC 4.57 4.00 - 5.40 M/uL    Hemoglobin 12.6 12.0 - 16.0 g/dL    Hematocrit 39.5 37.0 - 48.5 %    MCV 86 82 - 98 fL    MCH 27.6 27.0 - 31.0 pg    MCHC 31.9 (L) 32.0 - 36.0 g/dL    RDW 13.2 11.5 - 14.5 %    Platelets 269 150 - 450 K/uL    MPV 9.1 (L) 9.2 - 12.9 fL    Immature Granulocytes 0.4 0.0 - 0.5 %    Gran # (ANC) 3.2 1.8 - 7.7 K/uL    Immature Grans (Abs) 0.02 0.00 - 0.04 K/uL    Lymph # 1.4 1.0 - 4.8 K/uL    Mono # 0.4 0.3 - 1.0 K/uL    Eos # 0.2 0.0 - 0.5 K/uL    Baso # 0.03 0.00 - 0.20 K/uL    nRBC 0 0 /100 WBC    Gran % 62.2 38.0 - 73.0 %    Lymph % 26.5 18.0 - 48.0 %    Mono % 6.8 4.0 - 15.0 %    Eosinophil % 3.5 0.0 - 8.0 %    Basophil % 0.6 0.0 - 1.9 %    Differential Method Automated    Comprehensive metabolic panel    Collection Time: 11/04/24  2:16 PM   Result Value Ref Range    Sodium 140 136 - 145 mmol/L    Potassium 4.1 3.5 - 5.1 mmol/L    Chloride 107 95 - 110 mmol/L    CO2 20 (L) 23 - 29 mmol/L    Glucose 102 70 - 110 mg/dL    BUN 13 6 - 20 mg/dL    Creatinine 0.8 0.5 - 1.4 mg/dL    Calcium 9.1 8.7 - 10.5 mg/dL    Total Protein 7.2 6.0 - 8.4 g/dL    Albumin 4.2 3.5 - 5.2 g/dL    Total Bilirubin 0.3 0.1 - 1.0 mg/dL    Alkaline Phosphatase 72 40 - 150 U/L    AST 15 10 - 40 U/L     ALT 14 10 - 44 U/L    eGFR >60 >60 mL/min/1.73 m^2    Anion Gap 13 8 - 16 mmol/L   TSH    Collection Time: 11/04/24  2:16 PM   Result Value Ref Range    TSH 1.298 0.400 - 4.000 uIU/mL   Ethanol    Collection Time: 11/04/24  2:16 PM   Result Value Ref Range    Alcohol, Serum <10 <10 mg/dL   Acetaminophen level    Collection Time: 11/04/24  2:16 PM   Result Value Ref Range    Acetaminophen (Tylenol), Serum <3.0 (L) 10.0 - 20.0 ug/mL   COVID-19 Rapid Screening    Collection Time: 11/04/24  2:16 PM   Result Value Ref Range    SARS-CoV-2 RNA, Amplification, Qual Negative Negative   Salicylate level    Collection Time: 11/04/24  2:16 PM   Result Value Ref Range    Salicylate Lvl <5.0 (L) 15.0 - 30.0 mg/dL   Urinalysis, Reflex to Urine Culture Urine, Clean Catch    Collection Time: 11/04/24  2:46 PM    Specimen: Urine   Result Value Ref Range    Specimen UA Urine, Clean Catch     Color, UA Colorless (A) Yellow, Straw, Karo    Appearance, UA Clear Clear    pH, UA 6.0 5.0 - 8.0    Specific Gravity, UA 1.010 1.005 - 1.030    Protein, UA Negative Negative    Glucose, UA Negative Negative    Ketones, UA Negative Negative    Bilirubin (UA) Negative Negative    Occult Blood UA 2+ (A) Negative    Nitrite, UA Negative Negative    Urobilinogen, UA Negative <2.0 EU/dL    Leukocytes, UA 1+ (A) Negative   Urinalysis Microscopic    Collection Time: 11/04/24  2:46 PM   Result Value Ref Range    RBC, UA 0 0 - 4 /hpf    WBC, UA 11 (H) 0 - 5 /hpf    Bacteria Many (A) None-Occ /hpf    Microscopic Comment SEE COMMENT    Drug screen panel, emergency    Collection Time: 11/04/24  2:47 PM   Result Value Ref Range    Benzodiazepines Negative Negative    Methadone metabolites Negative Negative    Cocaine (Metab.) Negative Negative    Opiate Scrn, Ur Negative Negative    Barbiturate Screen, Ur Negative Negative    Amphetamine Screen, Ur Negative Negative    THC Presumptive Positive (A) Negative    Phencyclidine Negative Negative    Creatinine,  Urine 47.1 15.0 - 325.0 mg/dL    Toxicology Information SEE COMMENT          Imaging Results:  Imaging Results    None          The EKG was ordered, reviewed, and independently interpreted by the ED provider.    No EKG Ordered.    The Emergency Provider reviewed the vital signs and test results, which are outlined above.     ED Discussion       1:40 PM: The PEC hold has been issued by Dr. Jack at this time for SI.    3:58 PM: Pt has been medically cleared by Dr. Jack at this time. Reassessed pt at this time. Pt is resting comfortably and appears in no acute distress. There are no psychiatric services offered at this facility. D/w pt all pertinent ED information and plan to transfer to psychiatric facility for psychiatric treatment. Pt verbalizes understanding. Patient being transferred by [facility] for ongoing personal protection en route. Pt has been made aware of all risks and benefits associated with transfer, including but not limited to death, MVC, loss of vital signs, and/or permanent disability. Benefits include ability to be treated at an inpatient psychiatric facility. Pt will be transported by personnel trained in CPR and CPI. Patient understands that there could be unforeseen motor vehicle accidents, inclement weather, or loss of vital signs that could result in potential death or permanent disability. All questions and complaints have been addressed at this time. Pt condition is stable at this time and is clear to transfer to psychiatric facility at this time.     ED Course as of 11/04/24 1654   Mon Nov 04, 2024   1557 Marijuana (THC) Metabolite(!): Presumptive Positive [NF]   1557 WBC, UA(!): 11 [NF]   1557 Bacteria, UA(!): Many [NF]      ED Course User Index  [NF] Gabriela Jack, DO     Medical Decision Making  19-year-old female presents with suicidal ideation.  She was sad and depressed with poor eye contact.  Urine drug screen is positive for marijuana and she was findings suggest UTI.  She was  cooperative.  Treated UTI with cefdinir.  Medically cleared for further evaluation by Psychiatry.      Amount and/or Complexity of Data Reviewed  Labs: ordered. Decision-making details documented in ED Course.    Risk  Prescription drug management.       Additional MDM:   Differential Diagnosis:   Depression, anxiety, schizophrenia, substance abuse, infection, thyroid abnormality             ED Medication(s):  Medications   cefdinir capsule 300 mg (has no administration in time range)       New Prescriptions    CEFDINIR (OMNICEF) 300 MG CAPSULE    Take 1 capsule (300 mg total) by mouth 2 (two) times daily. for 7 days        Follow-up Information       Sahil Briones In 1 week.    Why: for UTI  Contact information:  [facility] [place] [address]  [phone]                                 Scribe Attestation:   Scribe #1: I performed the above scribed service and the documentation accurately describes the services I performed. I attest to the accuracy of the note.     Attending:   Physician Attestation Statement for Scribe #1: I, Gabriela Jack DO, personally performed the services described in this documentation, as scribed by Ciara Mckay, in my presence, and it is both accurate and complete.           Clinical Impression       ICD-10-CM ICD-9-CM   1. Medical clearance for psychiatric admission  Z00.8 V70.8   2. Acute cystitis without hematuria  N30.00 595.0       Disposition:   Disposition: Transferred  Condition: Stable         Gabriela Jack DO  11/04/24 7184

## 2024-11-04 NOTE — ED NOTES
T/c to patient's mother, Ainsley Singh at [phone].  Provided update as to patient's status and POC.

## 2024-11-04 NOTE — PROGRESS NOTES
"The patient location is:  [address], [place]   The chief complaint leading to consultation is: depression    Visit type: audiovisual    Face to Face time with patient: 60  60 minutes of total time spent on the encounter, which includes face to face time and non-face to face time preparing to see the patient (eg, review of tests), Obtaining and/or reviewing separately obtained history, Documenting clinical information in the electronic or other health record, Independently interpreting results (not separately reported) and communicating results to the patient/family/caregiver, or Care coordination (not separately reported).     Each patient to whom he or she provides medical services by telemedicine is:  (1) informed of the relationship between the physician and patient and the respective role of any other health care provider with respect to management of the patient; and (2) notified that he or she may decline to receive medical services by telemedicine and may withdraw from such care at any time.    Notes:     Individual Psychotherapy (PhD/LCSW)    11/4/2024    Site:  Telemed         Therapeutic Intervention: Met with patient.  Outpatient - Behavior modifying psychotherapy  min - CPT code 75452 + 60 min (90840 x2)    Chief complaint/reason for encounter: attention deficit, depression, mood swings, suicidal ideation, anxiety, and behavior     Interval history and content of current session:   Pt presented virtually for a follow up visit.   Pt is currently staying in [place] with a friend.   She talked about a fight she had with her father and him telling her she had to leave.    She complained that she has felt suicidal "for the past few day"  "I am not okay"   "I have to act like I am okay because of my family"   Completed CSSRS.   She is actively suicidal with a plan (pills)  She reported feeling anxious, depressed and suicidal. She has thought about jumping off a bridge and taking pills. I " "usually can talk myself down.   Pt says "I can't see a way out of this or ever getting better"     Behavioral Health Assessment:  Leonila was administered the following brief screening tools:    Patient Health Questionnaire  (PHQ-9)  Symptoms: Depression  Score: 19  Symptom Severity Range: moderately severe (15-19)  Depressed/sad (year): Yes  Difficulty: Extremely difficult  Suicidal ideation (month): Yes  Suicide attempt (every): No  RECOMMENDED HOSPITALIZATION.  Messaged CATALINO [place] and Jazmyn Hodges, PHD, Basilio MD [place]    Mother called Leonila at the end of the appt and did not agree with our plan for her to drive herself to the ED. Leonila became very upset with her mother trying to prevent her from going to the hospital. Mother reported she would text Dr Wilks for "a different plan".  I sent a secure message to Dr Wilks during the same time to let him and Hans and my supervisor Jazmyn Hodges know my thought process.   My plan is to keep pt on VV until she arrives at ED.   I told mother it was me who pushed her to go to the hospital.     I gave mother the address of the hospital in  where I was sending her [address]. [place].    Also spoke to Leonila about possibly doing [place] or other IOP once released from the hospital.    HANS was able to set a follow up appt with pt next WEDNESDAY at 1:30.     Treatment plan:  Target symptoms: depression, lack of focus, anxiety , adjustment  Why chosen therapy is appropriate versus another modality: relevant to diagnosis, patient responds to this modality, evidence based practice  Outcome monitoring methods: self-report, observation, feedback from family  Therapeutic intervention type: behavior modifying psychotherapy    Risk parameters:  Patient reports suicidal ideation: She reports passive suicide "almost daily"  Patient reports no homicidal ideation  Patient reports no self-injurious behavior  Patient reports no violent behavior    Verbal deficits: " None    Patient's response to intervention:  The patient's response to intervention is accepting.    Progress toward goals and other mental status changes:  The patient's progress toward goals is not progressing.    Diagnosis:     ICD-10-CM ICD-9-CM   1. Major depression, recurrent, chronic  F33.9 296.30   2. PTSD (post-traumatic stress disorder)  F43.10 309.81   3. PMDD (premenstrual dysphoric disorder)  F32.81 625.4   4. Generalized anxiety disorder  F41.1 300.02   5. Mood disorder  F39 296.90   6. Insomnia, unspecified type  G47.00 780.52   7. Attention deficit hyperactivity disorder (ADHD), predominantly inattentive type  F90.0 314.00     Plan:  individual psychotherapy    Return to clinic: as scheduled    Length of Service (minutes): 120

## 2024-11-05 PROBLEM — F33.2 SEVERE EPISODE OF RECURRENT MAJOR DEPRESSIVE DISORDER, WITHOUT PSYCHOTIC FEATURES: Status: ACTIVE | Noted: 2024-11-05

## 2024-11-05 NOTE — ED NOTES
T/c to patient's mother, Ainsley Singh at [phone].  Informed her of patient's placement at [facility].

## 2024-11-07 LAB — BACTERIA UR CULT: ABNORMAL

## 2024-11-08 NOTE — PROGRESS NOTES
"Outpatient Psychiatry Follow-Up Visit (MD/NP)    7/6/2022     The patient location is: [place], [place]  The chief complaint leading to consultation is: Anxiety, Depression, Aggression toward her mother, OCD, Panic attacks, Eating disorder, Insomnia, & ADHD    Visit type: audiovisual    Face to Face time with patient: 30 minutes  40 minutes of total time spent on the encounter, which includes face to face time and non-face to face time preparing to see the patient (eg, review of tests), Obtaining and/or reviewing separately obtained history, Documenting clinical information in the electronic or other health record, Independently interpreting results (not separately reported) and communicating results to the patient/family/caregiver, or Care coordination (not separately reported).         Each patient to whom he or she provides medical services by telemedicine is:  (1) informed of the relationship between the physician and patient and the respective role of any other health care provider with respect to management of the patient; and (2) notified that he or she may decline to receive medical services by telemedicine and may withdraw from such care at any time.    Notes:       Clinical Status of Patient:  Outpatient (Ambulatory)    Chief Complaint:  Leonila Singh is a 17 y.o. female who presents today for follow-up of depression, anxiety, attention problems and insomnia, panic attacks, OCD, and eating disorder .  Met with patient.      Interval History and Content of Current Session:  Interim Events/Subjective Report/Content of Current Session:   She described mood as "okay" and affect was appropriate. She reported medication compliance and denied any side effects to her current medication regimen of trintellix 5 mg daily, duloxetine 60 mg daily, klonopin 0.5 mg po daily prn, and adderall 10 mg po daily. She stated she does not take adderall 10 mg po daily when she in on her Summer break. 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 She reported improved sleep without sleep aid and stated she only took trazodone 2-3 times since her last visit. She stated she stopped taking atarax 10 mg po daily because she didn't find it effective in treating her insomnia and it caused her to feel tired.    She stated her PMDD, physical symptoms has improved but still has the emotional symptoms of PMDD. She is being treated by a different"specialist", who prescribed her progesterone. She stated she takes it on the 18th day of her cycle for 10 days and stops when her periods starts. She stated her iron level was low and her provider thinks it may be affecting patient's motivation, energy, and sleep. Patient stated she is taking iron tabs. She stated she is feeling depressed at this time. She denied suicidal thoughts and no plan or intent currently. She reported 3 previous suicide attempts by overdosing on pills and the past SI was 2 years ago. She rated her anxiety at 2-3/10 and depression at 4-5/10 with 10/10 being the most severe.     She reported overall improved depression and anxiety symptoms with her current medications. Patient requested increasing her trintellix dose to 10 daily due to depressive symptoms of low mood, amotivation, fatigue, and feeling of guilt. She denied feelings of helplessness, hopelessness, and anhedonia. Patient denied SI/HI/AH/VH and no delusion noted or reported. Patient denied symptoms of rafa and denied alcohol abuse or any type of illicit drug use.    Psychiatric Review Of Systems - Is patient experiencing or having changes in:  sleep: yes. Improving  appetite: normal  weight: normal  energy/anergy: no  interest/pleasure/anhedonia: no  Motivation: improving  somatic symptoms: no  anxiety/panic: yes but manageable  guilty/hopelessness: no  concentration: no  S.I.B.s/risky behavior: no  Irritability: no  Racing thoughts: no  Impulsive behaviors: no  Paranoia:no  AVH:no  Suicidal " "thoughts/plan/intent: no    Information form pervious encounter  Obtained and reviewed Dr. Vergara's office notes of previous medication trials as summarized below:  Zoloft 150 mg-"aggression toward parents worsened."  Lamictal- unknown dose  Prozac- "worsened mood."  Intuniv 2 mg- no improvement  Celexa 20 mg   Risperdal .25 mg BID  Luvox CR up to 150 mg   Abilify 3 mg  Concerta- "I hated how I felt like a robot all day long when I took that!"  Contempla  Buspar  Invega 3 mg  Vrylar was denied  Adderall XR caused irritability  Latuda (didn't find helpful and it made her hungry"  Topamax    Psychotherapy:  Target symptoms: depression, anxiety , PMDD, and sleep problems  Why chosen therapy is appropriate versus another modality: relevant to diagnosis, patient responds to this modality, evidence based practice  Outcome monitoring methods: self-report, observation  Therapeutic intervention type: insight oriented psychotherapy, behavior modifying psychotherapy, supportive psychotherapy  Topics discussed/themes: building skills sets for symptom management, symptom recognition  The patient's response to the intervention is accepting. The patient's progress toward treatment goals is fair.   Duration of intervention: 10 minutes.    Review of Systems   PSYCHIATRIC: Pertinant items are noted in the narrative.  CONSTITUTIONAL: No weight gain or loss.   MUSCULOSKELETAL: No pain or stiffness of the joints.  NEUROLOGIC: No weakness, sensory changes, seizures, confusion, memory loss, tremor or other abnormal movements.  ENDOCRINE: No polydipsia or polyuria.  INTEGUMENTARY: No rashes or lacerations.  EYES: No exophthalmos, jaundice or blindness.  ENT: No dizziness, tinnitus or hearing loss.  RESPIRATORY: No shortness of breath.  CARDIOVASCULAR: No tachycardia or chest pain.  GASTROINTESTINAL: No nausea, vomiting, pain, constipation or diarrhea.  GENITOURINARY: No frequency, dysuria or sexual dysfunction.  HEMATOLOGIC/LYMPHATIC: No " "excessive bleeding, prolonged or excessive bleeding after dental extraction/injury.  ALLERGIC/IMMUNOLOGIC: No allergic response to materials, foods or animals at this time.       Past Medical, Family and Social History: The patient's past medical, family and social history have been reviewed and updated as appropriate within the electronic medical record - see encounter notes.    Compliance: yes    Side effects: None    Risk Parameters:  Patient reports no suicidal ideation  Patient reports no homicidal ideation  Patient reports no self-injurious behavior  Patient reports no violent behavior    Exam (detailed: at least 9 elements; comprehensive: all 15 elements)   Constitutional  Vitals:  Most recent vital signs, dated greater than 90 days prior to this appointment, were reviewed.   There were no vitals filed for this visit.     General:  unremarkable, age appropriate     Musculoskeletal  Muscle Strength/Tone:  not examined   Gait & Station:  non-ataxic     Psychiatric  Speech:  no latency; no press   Mood & Affect:  "okay"  congruent and appropriate   Thought Process:  normal and logical   Associations:  intact   Thought Content:  normal, no suicidality, no homicidality, delusions, or paranoia   Insight:  intact, has awareness of illness   Judgement: behavior is adequate to circumstances   Orientation:  grossly intact   Memory: intact for content of interview   Language: grossly intact, able to name, able to repeat   Attention Span & Concentration:  able to focus   Fund of Knowledge:  intact and appropriate to age and level of education, familiar with aspects of current personal life     Assessment and Diagnosis   Status/Progress: Based on the examination today, the patient's problem(s) is/are improved.  New problems have not been presented today.   Co-morbidities, Diagnostic uncertainty and Lack of compliance are not complicating management of the primary condition.  There are no active rule-out diagnoses for this " patient at this time.     General Impression: Doing okay        1. Mood disorder  F39 296.90     2. Anxiety disorder, unspecified type  F41.9 300.00   3. Obsessive-compulsive disorder, unspecified type  F42.9 300.3   4. ADHD (attention deficit hyperactivity disorder), inattentive type  F90.0 314.00   5. Parent-child relational problem  Z62.820 V61.20   6. Oppositional defiant behavior  R46.89 V40.39   7. Eating disorder, unspecified type  F50.9 307.50      R/O Borderline Personality Disorder        Intervention/Counseling/Treatment Plan     Medication Management: Continue current medications. The risks and benefits of medication were discussed with the patient.  -Continue Cymbalta 60 mg daily for anxiety, panic attacks and depression  -Continue trintellix 5 mg po daily for MDD  -Continue Klonopin 0.5 mg po daily prn for panic attacks  -Continue Adderall IR 10 mg daily for ADHD. Not taking now in the Summer  checked  and no signs on misuse  -Discussed medication and treatment options. Went over the risks, benefits, side effects and alternatives of taking the listed above medication.   -We discussed risk of Serotonin Syndrome including symptoms in order of appearance: diarrhea, restlessness, extreme agitation, autonomic instability, hyperthermia, rigidity, coma, and death.  -Discussed black box warning of increased Suicidal thoughts in individuals younger than 24 years old and the steps to take if patient ever experiences increased SI.   -Contracted for safety and non suicide plan (contact family, suicide hotline, call 911 or go the nearest emergency room)  -Continue Sleep hygiene and melatonin 3-5 mg po qhs prn for sleep problems  -Discontinue atarax 10 mg po qhs prn for insomnia if  melatonin is not effective  -Continue trazodone 25 mg po qhs prn (take 1/2 tab of trazodone 50 mg po qhs for insomnia) do not mix with klonopin or atarax  -Continue Jazmyn Hodges PHD for family work and to [facility] Rad[facility] for  individual   -Continue with meeting with the nutritionist  -Discussed informed consent, diagnosis, risks and benefits of proposed treatment above vs alternative treatments vs no treatment, and potential side effects of these treatments. The patient expresses understanding of the above and displays the capacity to agree with this treatment given said understanding. Patient also agrees that, currently, the benefits outweigh the risks and would like to pursue treatment at this time. Answered all questions and discussed follow up. Encouraged patient to contact us with any questions or concerns.   -Encouraged Patient to keep future appointments.  -Take medications as prescribed and abstain from substance abuse.  -Patient to present to ED for thoughts to harm herself or others    Return to Clinic: One month or earlier as needed     WILMER Cuba-BC     Statement Selected

## 2024-11-10 ENCOUNTER — PATIENT MESSAGE (OUTPATIENT)
Dept: PSYCHIATRY | Facility: CLINIC | Age: 19
End: 2024-11-10
Payer: COMMERCIAL

## 2024-11-13 ENCOUNTER — OFFICE VISIT (OUTPATIENT)
Dept: PSYCHIATRY | Facility: CLINIC | Age: 19
End: 2024-11-13
Payer: COMMERCIAL

## 2024-11-13 VITALS
DIASTOLIC BLOOD PRESSURE: 57 MMHG | WEIGHT: 176.5 LBS | HEART RATE: 66 BPM | BODY MASS INDEX: 30.29 KG/M2 | SYSTOLIC BLOOD PRESSURE: 117 MMHG

## 2024-11-13 DIAGNOSIS — G47.00 INSOMNIA, UNSPECIFIED TYPE: ICD-10-CM

## 2024-11-13 DIAGNOSIS — F33.1 MODERATE EPISODE OF RECURRENT MAJOR DEPRESSIVE DISORDER: Primary | ICD-10-CM

## 2024-11-13 DIAGNOSIS — F41.9 ANXIETY DISORDER, UNSPECIFIED TYPE: ICD-10-CM

## 2024-11-13 PROCEDURE — 99214 OFFICE O/P EST MOD 30 MIN: CPT | Mod: S$GLB,,, | Performed by: NURSE PRACTITIONER

## 2024-11-13 PROCEDURE — 99999 PR PBB SHADOW E&M-EST. PATIENT-LVL II: CPT | Mod: PBBFAC,,, | Performed by: NURSE PRACTITIONER

## 2024-11-13 PROCEDURE — G2211 COMPLEX E/M VISIT ADD ON: HCPCS | Mod: S$GLB,,, | Performed by: NURSE PRACTITIONER

## 2024-11-13 RX ORDER — DULOXETIN HYDROCHLORIDE 60 MG/1
60 CAPSULE, DELAYED RELEASE ORAL DAILY
Qty: 30 CAPSULE | Refills: 1 | Status: SHIPPED | OUTPATIENT
Start: 2024-11-13

## 2024-11-13 RX ORDER — TRAZODONE HYDROCHLORIDE 50 MG/1
50 TABLET ORAL NIGHTLY PRN
Qty: 90 TABLET | Refills: 0 | Status: SHIPPED | OUTPATIENT
Start: 2024-11-13 | End: 2025-02-11

## 2024-11-13 NOTE — PROGRESS NOTES
"Outpatient Psychiatry Follow-Up Visit (MD/NP)    11/13/2024     The patient location is: [place], [place]  The chief complaint leading to consultation is: Anxiety, Depression, OCD, Panic attacks, Eating disorder, Insomnia, & ADHD    Clinical Status of Patient:  Outpatient (Ambulatory)    Chief Complaint:  Leonila Singh is a 19 y.o. female who presents today for follow-up of depression, anxiety, attention problems and insomnia, panic attacks, OCD, and eating disorder .  Met with patient     Interval History and Content of Current Session:  Interim Events/Subjective Report/Content of Current Session:     Patient presented late for in person to this visit. She stated she was hospitalized at inpatient psychiatric unit at [facility] for a week ago due to suicidal ideation and depression and feels better after completing her hospitalization. She stated she has been struggling with chronic depression. She denied having any suicidal ideation and no suicide plan or intent currently. She stated she would not kill or harm herself. She had no access to guns.     She stated she has been taking vraylar 3 mg po daily for a week. She stated she is still taking duloxetine 60 mg po daily and finds them both helpful in treating her depression and anxiety.   She reported taking trazodone 50 mg po qhs prn about 3-4 nights per week, helps with sleep, gets about 6-8 hours of sleep, and stated she does not take every night because she does not want to be dependent on it for sleep.    She stated, "I have sensory issues that bother me. Loud noises make me feel overwhelmed. I struggle with work and school because of overstimulation". We discussed getting evaluated by a psychologist for these symptoms and encouraged patient to discuss her symptoms with  her therapist, SEAN Bonner. She stated she wears noise cancelling headphone and finds it helpful.    She reported a small amount of marijuana use and stated "weed helps with my " "phycial pain and it helps me with the overstimulation when I get home from a long day". She stated she smokes "weed" most nights of the week and "I take just one hit". We discussed the risks of marijuana use and encouraged abstaining from marijuana use. She stated she drinks 2-3 drinks about once a month and denied alcohol abuse.     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 She reported exercising 3 days per week     She stated she gets depressed around her menstrual cycle.     She rated her anxiety and depression at 6/10 with 10/10 being the most severe.     She reported overall "alright mood", low motivation, fatigue, and anhedonia.    She stated she feels better due to having family support. She sees her therapist, Kailey Radford LCSW weekly.    In her previous visits she stated she finished ketamine treatment and didn't find it helpful in improving her depression. She completed 30 treatments TMS and found it helpful.    She stated she competed HARRIET, and is no longer Dr. Angeline Chaudhry, for therapy and finds it helpful.     We discussed the risks of drinking alcohol use including death from alcohol poisoning. We dicussed medication interaction between alcohol, marijuana, and klonopin that leads to respiratory depression and/or even death.  Patient verbalized understanding.  She is not taking klonopin at this time and is not drinking excessive amount of alcohol.    She stated she is not taking adderall at this time.    Encouraged patient to utilize her effective coping skills to manage her anxiety and panic attacks. Patient verbalized understanding of how this is for her best interest.    Patient denied auditory or visual hallucination at this time.    She denied SI/HI/AVH and no objective sings or symptoms of rafa or psychosis.     Contracted for safety and non suicide plan (contact family, suicide hotline, call 911 or go the nearest emergency " "room).    Patient gave the writer of this note verbal permission to speak with her mother and will have patient sign DANIKA when she comes to the office in person. Patient provided a verbal agreement to communicate with her mother regarding her mental health. Patient's mother requested # 90 day supply and patient's mother will monitor dispensing.       Psychiatric Review Of Systems - Is patient experiencing or having changes in:  sleep: good  appetite: normal  weight: normal  energy/anergy: low  interest/pleasure/anhedonia: yes  Motivation: low  somatic symptoms: no  anxiety/panic: yes but manageable  guilty/hopelessness: no  concentration: no  S.I.B.s/risky behavior: no  Irritability: no  Racing thoughts: no  Impulsive behaviors: no longer impulsive  Paranoia:no  AVH:no  Suicidal thoughts/plan/intent: no  SE: no  Drugs: No. See HPI  ETOH: no abuse      Information form previous encounter  Obtained and reviewed Dr. Vergara's office notes of previous medication trials as summarized below:  Zoloft 150 mg-"aggression toward parents worsened."  Lamictal- unknown dose Does not remember  Prozac- "worsened mood."  Intuniv 2 mg- no improvement  Celexa 20 mg   Risperdal .25 mg BID  Luvox CR up to 150 mg   Abilify 3 mg caused weight gain of 60 lbs per patient  Concerta- "I hated how I felt like a robot all day long when I took that!"  Contempla  Buspar  Invega 3 mg  Varylar was denied  Adderall XR caused irritability  Latuda (didn't find helpful and it made her hungry"  Topamax  Rexulti 0.5 mg daily due to cost (not covered by her insurance)  Atarax 10 mg po qhs prn for insomnia because she does not like the way she feels while taking it  Melatonin is not effective  Klonopin 0.5 mg po daily prn    Psychotherapy:  Target symptoms: depression, anxiety , PMDD, and sleep problems  Why chosen therapy is appropriate versus another modality: relevant to diagnosis, patient responds to this modality, evidence based practice  Outcome " monitoring methods: self-report, observation  Therapeutic intervention type: insight oriented psychotherapy, behavior modifying psychotherapy, supportive psychotherapy  Topics discussed/themes: building skills sets for symptom management, symptom recognition  The patient's response to the intervention is accepting. The patient's progress toward treatment goals is fair.   Duration of intervention: 20 minutes.    Review of Systems   PSYCHIATRIC: Pertinant items are noted in the narrative.  CONSTITUTIONAL: No weight gain or loss.   MUSCULOSKELETAL: No pain or stiffness of the joints.  NEUROLOGIC: No weakness, sensory changes, seizures, confusion, memory loss, tremor or other abnormal movements.  ENDOCRINE: No polydipsia or polyuria.  INTEGUMENTARY: No rashes or lacerations.  EYES: No exophthalmos, jaundice or blindness.  ENT: No dizziness, tinnitus or hearing loss.  RESPIRATORY: No shortness of breath.  CARDIOVASCULAR: No tachycardia or chest pain.  GASTROINTESTINAL: No nausea, vomiting, pain, constipation or diarrhea.  GENITOURINARY: No frequency, dysuria or sexual dysfunction.  HEMATOLOGIC/LYMPHATIC: No excessive bleeding, prolonged or excessive bleeding after dental extraction/injury.  ALLERGIC/IMMUNOLOGIC: No allergic response to materials, foods or animals at this time.       Past Medical, Family and Social History: The patient's past medical, family and social history have been reviewed and updated as appropriate within the electronic medical record - see encounter notes.    Compliance: yes    Side effects: None    Risk Parameters:  Patient reports no suicidal ideation  Patient reports no homicidal ideation  Patient reports no self-injurious behavior  Patient reports no violent behavior    Exam (detailed: at least 9 elements; comprehensive: all 15 elements)   Constitutional  Vitals:  Most recent vital signs, dated greater than 90 days prior to this appointment, were reviewed.   Vitals:    11/13/24 1305   BP: (!)  "117/57   Pulse: 66   Weight: 80 kg (176 lb 7.7 oz)        General:  unremarkable, age appropriate     Musculoskeletal  Muscle Strength/Tone:  not examined   Gait & Station:  non-ataxic     Psychiatric  Speech:  no latency; no press   Mood & Affect:  "alright"  Euthymic and appropriate   Thought Process:  normal and logical   Associations:  intact   Thought Content:  normal, no suicidality, no homicidality, delusions, or paranoia   Insight:  intact, has awareness of illness   Judgement: behavior is adequate to circumstances   Orientation:  grossly intact   Memory: intact for content of interview   Language: grossly intact, able to name, able to repeat   Attention Span & Concentration:  able to focus   Fund of Knowledge:  intact and appropriate to age and level of education, familiar with aspects of current personal life     Assessment and Diagnosis   Status/Progress: Based on the examination today, the patient's problem(s) is/are improved.  New problems have not been presented today.   Co-morbidities, Diagnostic uncertainty and Lack of compliance are not complicating management of the primary condition.  There are no active rule-out diagnoses for this patient at this time.     General Impression: Stable on her current treatment and medication regimen and requested keeping the same dosages of her medications due to their effectiveness in improving her symptoms. She denied suicidal ideation and no suicide plan or intent currently. She denied any side or adverse effects to her medications at this time.     MDD recurrent moderate    2. Anxiety disorder, unspecified type  F41.9 300.00   3. Insomnia unspecified    ICD-10-CM ICD-9-CM       Intervention/Counseling/Treatment Plan     Medication Management: Continue current medications. The risks and benefits of medication were discussed with the patient.  -Continue Cymbalta 90 mg daily for anxiety, panic attacks and depression  -Continue Vraylar 3 mg po daily for depression. " Started during patient's inpatient psychiatric hospitalion a week ago.  -Completed TMS and Ketamine treatments by different providers  -Not taking Adderall IR 10 mg daily for ADHD as needed. Not taking now because is not in school  -checked  and no signs on misuse  -Discussed medication and treatment options. Went over the risks, benefits, side effects and alternatives of taking the listed above medication.   -We discussed risk of Serotonin Syndrome including symptoms in order of appearance: diarrhea, restlessness, extreme agitation, autonomic instability, hyperthermia, rigidity, coma, and death.  -Discussed black box warning of increased Suicidal thoughts in individuals younger than 24 years old and the steps to take if patient ever experiences increased SI.   -Contracted for safety and non suicide plan (contact family, suicide hotline, call 911 or go the nearest emergency room)  -Continue Sleep hygiene and melatonin 3-5 mg po qhs prn for sleep problems  -Continue Trazodone 50 mg po qhs prn as an off label for insomnia  -Continue therapy  -Discussed informed consent, diagnosis, risks and benefits of proposed treatment above vs alternative treatments vs no treatment, and potential side effects of these treatments. The patient expresses understanding of the above and displays the capacity to agree with this treatment given said understanding. Patient also agrees that, currently, the benefits outweigh the risks and would like to pursue treatment at this time. Answered all questions and discussed follow up. Encouraged patient to contact us with any questions or concerns.   -Counseled on the risks of alcohol use and discussed SAMSA guidelines of no more than 3 drinks per day and no more than 7 drinks per week for women  -Encouraged Patient to keep future appointments.  -Take medications as prescribed and abstain from substance abuse.  -Patient to present to Emergency Department for thoughts to harm herself or  others    Return to Clinic: 2 weeks or earlier as needed     Mely Lama, PMAMURIZIOP-BC

## 2024-11-15 ENCOUNTER — PATIENT MESSAGE (OUTPATIENT)
Dept: PSYCHIATRY | Facility: CLINIC | Age: 19
End: 2024-11-15
Payer: COMMERCIAL

## 2024-11-16 ENCOUNTER — PATIENT MESSAGE (OUTPATIENT)
Dept: OBSTETRICS AND GYNECOLOGY | Facility: CLINIC | Age: 19
End: 2024-11-16
Payer: COMMERCIAL

## 2024-11-17 RX ORDER — CARIPRAZINE 1.5 MG/1
1.5 CAPSULE, GELATIN COATED ORAL DAILY
Qty: 30 CAPSULE | Refills: 1 | Status: SHIPPED | OUTPATIENT
Start: 2024-11-17 | End: 2024-11-20 | Stop reason: SDUPTHER

## 2024-11-18 ENCOUNTER — OFFICE VISIT (OUTPATIENT)
Dept: PSYCHIATRY | Facility: CLINIC | Age: 19
End: 2024-11-18
Payer: COMMERCIAL

## 2024-11-18 DIAGNOSIS — F33.1 MODERATE EPISODE OF RECURRENT MAJOR DEPRESSIVE DISORDER: Primary | ICD-10-CM

## 2024-11-18 DIAGNOSIS — G47.00 INSOMNIA, UNSPECIFIED TYPE: ICD-10-CM

## 2024-11-18 DIAGNOSIS — F32.81 PMDD (PREMENSTRUAL DYSPHORIC DISORDER): ICD-10-CM

## 2024-11-18 DIAGNOSIS — F43.10 PTSD (POST-TRAUMATIC STRESS DISORDER): ICD-10-CM

## 2024-11-18 DIAGNOSIS — F41.9 ANXIETY DISORDER, UNSPECIFIED TYPE: ICD-10-CM

## 2024-11-18 PROCEDURE — 90837 PSYTX W PT 60 MINUTES: CPT | Mod: 95,,,

## 2024-11-18 NOTE — PROGRESS NOTES
[place]  The chief complaint leading to consultation is: depression    Visit type: audiovisual    Face to Face time with patient: 60  60 minutes of total time spent on the encounter, which includes face to face time and non-face to face time preparing to see the patient (eg, review of tests), Obtaining and/or reviewing separately obtained history, Documenting clinical information in the electronic or other health record, Independently interpreting results (not separately reported) and communicating results to the patient/family/caregiver, or Care coordination (not separately reported).     Each patient to whom he or she provides medical services by telemedicine is:  (1) informed of the relationship between the physician and patient and the respective role of any other health care provider with respect to management of the patient; and (2) notified that he or she may decline to receive medical services by telemedicine and may withdraw from such care at any time.    Notes:     Individual Psychotherapy (PhD/LCSW)    11/18/2024    Site:  Telemed         Therapeutic Intervention: Met with patient.  Outpatient - Behavior modifying psychotherapy  60 min - CPT code 33357    Chief complaint/reason for encounter: depression, anxiety, and behavior     Interval history and content of current session:   Pt presented virtually for a follow up visit.   She is doing much better after going to [facility].   We discussed difficult family dynamics and her overall goal of being more independent from her parents.   She is interested in getting new PCP, She is following up with OBGYN about options, we discussed IOP possibly to do over x-mas break, she plans to continue to work. We also talked about having her do EMDR with someone really experienced like Ruth Humphries.     She is feeling better. Not sure if it is because she is not having menstrual cycle or if it is the new meds helping.   Sleep has been  ""good" since returning from hospital. She feels stable again.     Treatment plan:  Target symptoms: depression, lack of focus, anxiety , adjustment  Why chosen therapy is appropriate versus another modality: relevant to diagnosis, patient responds to this modality, evidence based practice  Outcome monitoring methods: self-report, observation, feedback from family  Therapeutic intervention type: behavior modifying psychotherapy    Risk parameters:  Patient reports suicidal ideation: She reports no suicidal ideation since released form hospital   Patient reports no homicidal ideation  Patient reports no self-injurious behavior  Patient reports no violent behavior    Verbal deficits: None    Patient's response to intervention:  The patient's response to intervention is accepting.    Progress toward goals and other mental status changes:  The patient's progress toward goals is limited.    Diagnosis:     ICD-10-CM ICD-9-CM   1. Moderate episode of recurrent major depressive disorder  F33.1 296.32   2. PMDD (premenstrual dysphoric disorder)  F32.81 625.4   3. PTSD (post-traumatic stress disorder)  F43.10 309.81   4. Anxiety disorder, unspecified type  F41.9 300.00   5. Insomnia, unspecified type  G47.00 780.52       Plan:  individual psychotherapy    Return to clinic: as scheduled    Length of Service (minutes): 120            "

## 2024-11-20 RX ORDER — CARIPRAZINE 1.5 MG/1
1.5 CAPSULE, GELATIN COATED ORAL DAILY
Qty: 30 CAPSULE | Refills: 1 | Status: SHIPPED | OUTPATIENT
Start: 2024-11-20 | End: 2024-12-20

## 2024-11-25 ENCOUNTER — TELEPHONE (OUTPATIENT)
Dept: PSYCHIATRY | Facility: CLINIC | Age: 19
End: 2024-11-25
Payer: COMMERCIAL

## 2024-11-26 ENCOUNTER — OFFICE VISIT (OUTPATIENT)
Dept: PSYCHIATRY | Facility: CLINIC | Age: 19
End: 2024-11-26
Payer: COMMERCIAL

## 2024-11-26 DIAGNOSIS — F41.9 ANXIETY DISORDER, UNSPECIFIED TYPE: ICD-10-CM

## 2024-11-26 DIAGNOSIS — G47.00 INSOMNIA, UNSPECIFIED TYPE: ICD-10-CM

## 2024-11-26 DIAGNOSIS — F32.81 PMDD (PREMENSTRUAL DYSPHORIC DISORDER): ICD-10-CM

## 2024-11-26 DIAGNOSIS — F43.10 PTSD (POST-TRAUMATIC STRESS DISORDER): ICD-10-CM

## 2024-11-26 DIAGNOSIS — F33.1 MODERATE EPISODE OF RECURRENT MAJOR DEPRESSIVE DISORDER: Primary | ICD-10-CM
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
## 2024-12-02 ENCOUNTER — OFFICE VISIT (OUTPATIENT)
Dept: PSYCHIATRY | Facility: CLINIC | Age: 19
End: 2024-12-02
Payer: COMMERCIAL

## 2024-12-02 DIAGNOSIS — F41.9 ANXIETY DISORDER, UNSPECIFIED TYPE: ICD-10-CM

## 2024-12-02 DIAGNOSIS — F33.1 MODERATE EPISODE OF RECURRENT MAJOR DEPRESSIVE DISORDER: Primary | ICD-10-CM

## 2024-12-02 DIAGNOSIS — F43.10 PTSD (POST-TRAUMATIC STRESS DISORDER): ICD-10-CM

## 2024-12-02 DIAGNOSIS — F32.81 PMDD (PREMENSTRUAL DYSPHORIC DISORDER): ICD-10-CM
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
## 2024-12-09 ENCOUNTER — OFFICE VISIT (OUTPATIENT)
Dept: PSYCHIATRY | Facility: CLINIC | Age: 19
End: 2024-12-09
Payer: COMMERCIAL

## 2024-12-09 DIAGNOSIS — F33.1 MODERATE EPISODE OF RECURRENT MAJOR DEPRESSIVE DISORDER: Primary | ICD-10-CM

## 2024-12-09 DIAGNOSIS — F32.81 PMDD (PREMENSTRUAL DYSPHORIC DISORDER): ICD-10-CM

## 2024-12-09 DIAGNOSIS — F41.9 ANXIETY DISORDER, UNSPECIFIED TYPE: ICD-10-CM

## 2024-12-09 DIAGNOSIS — G47.00 INSOMNIA, UNSPECIFIED TYPE: ICD-10-CM

## 2024-12-09 DIAGNOSIS — F43.10 PTSD (POST-TRAUMATIC STRESS DISORDER): ICD-10-CM

## 2024-12-09 NOTE — PROGRESS NOTES
"  The patient location is: [place]  The chief complaint leading to consultation is: depression    Visit type: audiovisual    Face to Face time with patient: 60  60 minutes of total time spent on the encounter, which includes face to face time and non-face to face time preparing to see the patient (eg, review of tests), Obtaining and/or reviewing separately obtained history, Documenting clinical information in the electronic or other health record, Independently interpreting results (not separately reported) and communicating results to the patient/family/caregiver, or Care coordination (not separately reported).     Each patient to whom he or she provides medical services by telemedicine is:  (1) informed of the relationship between the physician and patient and the respective role of any other health care provider with respect to management of the patient; and (2) notified that he or she may decline to receive medical services by telemedicine and may withdraw from such care at any time.    Notes:     Individual Psychotherapy (PhD/LCSW)    12/9/2024    Site:  Telemed         Therapeutic Intervention: Met with patient.  Outpatient - Behavior modifying psychotherapy  60 min - CPT code 57456    Chief complaint/reason for encounter: depression, anxiety, and behavior     Interval history and content of current session:   Pt presented virtually for a follow up visit.   She reports to "feeling depressed" and "its pretty bad".    We talked about how it was going with new meds and when she sees CATALINO Cbua  again.   She revealed that she has been unable to get Vraylor filled because the pharmacy didn't have it.  Instructed pt to discuss these difficulties with Mely when she is has her appointment.     We also discussed her doing the IOP again.  If she is not feeling better.  She not planning to return to school and just work instead next semester. She had been planning to work 3-8 hour days.  I suggested maybe " "having 5- 5 hour days to get her out of the house daily and to keep her on a schedule.  She thinks that might be possible.      We also talked about her self talk. For now wondering if she can notice when she is using negative self talk "I have lots of problems"; or "My trauma makes it so I can't do xyz"  For now she can just pay attention how much she does it and if it is helpful to her in furtherance of in the future determing if this helping her move toward her values.         Treatment plan:  Target symptoms: depression, lack of focus, anxiety , adjustment  Why chosen therapy is appropriate versus another modality: relevant to diagnosis, patient responds to this modality, evidence based practice  Outcome monitoring methods: self-report, observation, feedback from family  Therapeutic intervention type: behavior modifying psychotherapy    Risk parameters:  Patient reports suicidal ideation: She reports no suicidal ideation since released form hospital   Patient reports no homicidal ideation  Patient reports no self-injurious behavior  Patient reports no violent behavior    Verbal deficits: None    Patient's response to intervention:  The patient's response to intervention is accepting.    Progress toward goals and other mental status changes:  The patient's progress toward goals is limited.    Diagnosis:     ICD-10-CM ICD-9-CM   1. Moderate episode of recurrent major depressive disorder  F33.1 296.32   2. PMDD (premenstrual dysphoric disorder)  F32.81 625.4   3. PTSD (post-traumatic stress disorder)  F43.10 309.81   4. Anxiety disorder, unspecified type  F41.9 300.00   5. Insomnia, unspecified type  G47.00 780.52           Plan:  individual psychotherapy    Return to clinic: as scheduled    Length of Service (minutes): 60                  "

## 2024-12-09 NOTE — PATIENT INSTRUCTIONS
1) Get Get Out of Your Mind [facility] Into Your Life by Dr Daniel Silva from Library or though [facility].   2)  Make PCP appt.   3)  Do follow up  appt with Mely on Wednesday(Discuss with her mail in pharmacy if she can't get her meds).  and OBGYN Appt to discuss options.     4)Talk to mom about doing IOP. I will also email the SW in charge of enrollment and ask her to work on approval from Insurance.

## 2024-12-11 ENCOUNTER — PATIENT MESSAGE (OUTPATIENT)
Dept: PSYCHIATRY | Facility: CLINIC | Age: 19
End: 2024-12-11
Payer: COMMERCIAL

## 2024-12-11 ENCOUNTER — TELEPHONE (OUTPATIENT)
Dept: PSYCHIATRY | Facility: CLINIC | Age: 19
End: 2024-12-11
Payer: COMMERCIAL

## 2024-12-16 ENCOUNTER — OFFICE VISIT (OUTPATIENT)
Dept: PSYCHIATRY | Facility: CLINIC | Age: 19
End: 2024-12-16
Payer: COMMERCIAL

## 2024-12-16 DIAGNOSIS — F32.81 PMDD (PREMENSTRUAL DYSPHORIC DISORDER): ICD-10-CM

## 2024-12-16 DIAGNOSIS — F43.10 PTSD (POST-TRAUMATIC STRESS DISORDER): ICD-10-CM

## 2024-12-16 DIAGNOSIS — F33.2 SEVERE EPISODE OF RECURRENT MAJOR DEPRESSIVE DISORDER, WITHOUT PSYCHOTIC FEATURES: Primary | ICD-10-CM

## 2024-12-16 DIAGNOSIS — G47.00 INSOMNIA, UNSPECIFIED TYPE: ICD-10-CM

## 2024-12-16 PROCEDURE — 90837 PSYTX W PT 60 MINUTES: CPT | Mod: 95,,,

## 2024-12-16 PROCEDURE — 90785 PSYTX COMPLEX INTERACTIVE: CPT | Mod: 95,,,

## 2024-12-16 NOTE — PROGRESS NOTES
"  The patient location is: [place]  The chief complaint leading to consultation is: depression    Visit type: audiovisual    Face to Face time with patient: 60  60 minutes of total time spent on the encounter, which includes face to face time and non-face to face time preparing to see the patient (eg, review of tests), Obtaining and/or reviewing separately obtained history, Documenting clinical information in the electronic or other health record, Independently interpreting results (not separately reported) and communicating results to the patient/family/caregiver, or Care coordination (not separately reported).     Each patient to whom he or she provides medical services by telemedicine is:  (1) informed of the relationship between the physician and patient and the respective role of any other health care provider with respect to management of the patient; and (2) notified that he or she may decline to receive medical services by telemedicine and may withdraw from such care at any time.    Notes:     Individual Psychotherapy (PhD/LCSW)    12/16/2024    Site:  Telemed         Therapeutic Intervention: Met with patient.  Outpatient - Behavior modifying psychotherapy  60 min - CPT code 35523    Chief complaint/reason for encounter: depression, anxiety, and behavior     Interval history and content of current session:   Pt presented virtually for a follow up visit.   Pt "not doing well" ; She is reporting a lot of physical pain in back and joints.   She missed her recent appt with Mely Lama (she tried to switch it to virtual) but it didn't get to Mely in time, but she said pt can re-schedule.     We discussed first addressing her physical pain and make sure it is really addressed.   We also discussed her relationship with her family specifically her brother who is back living at home before going to [employer].    She "wishes" he family were more supportive and isn't getting that support from them. " "  Talked about finding other people in her life who can help support her (and reminded her to be careful because she is vulnerable to anyone who gives her what she "needs".    Gave her a list of group options she could supplement and make some social connections with other trying to get well.   Interpersonal group- every (Tues 5:30-6:45, $30-$50/session)   https://www.[employer]/groups/interpersonal-therapy-group-[facility]-la/[address]24    The Daring Way (based on the research of Travis Holman) (sliding scale)  https://www.[employer]/groups/the-daring-waytm-new-[place]-la/07754    Internal Family Systems ((FS) ($40/group)  https://www.[employer]/groups/wounds-to-wisdom-an-ifs-self-discovery-group-[facility]-la/211042    Wandering Mindfulness group: ($20-$70 per group)  https://www.[employer]/groups/wandering-together-outdoor-group-therapy-new[facility]-la/415302    We also discussed [facility] vs [facility] and if it would be possible.     Talked about future plans.     Interactive complexity (69921)was present due to the need for coordination with multiple providers to address the patients ongoing treatment needs. Communication was initiated with the patients new primary care provider (PCP) to discuss medical considerations.  Concurrently, contact was made with Intensive Outpatient Program (IOP) team to help find appropriate IOP. . Additional time was devoted to identifying and researching community resources to supplement care, including  peer support groups. This required clarifying the patients eligibility, making referrals, and integrating these services into the treatment plan.    Treatment plan:  Target symptoms: depression, lack of focus, anxiety , adjustment  Why chosen therapy is appropriate versus another modality: relevant to diagnosis, patient responds to this modality, evidence based practice  Outcome monitoring methods: self-report, observation, " feedback from family  Therapeutic intervention type: behavior modifying psychotherapy    Risk parameters:  Patient reports no suicidal ideation  Patient reports no homicidal ideation  Patient reports no self-injurious behavior  Patient reports no violent behavior    Verbal deficits: None    Patient's response to intervention:  The patient's response to intervention is accepting.    Progress toward goals and other mental status changes:  The patient's progress toward goals is limited.    Diagnosis:     ICD-10-CM ICD-9-CM   1. Severe episode of recurrent major depressive disorder, without psychotic features  F33.2 296.33   2. PMDD (premenstrual dysphoric disorder)  F32.81 625.4   3. PTSD (post-traumatic stress disorder)  F43.10 309.81   4. Insomnia, unspecified type  G47.00 780.52         Plan:  individual psychotherapy    Return to clinic: as scheduled    Length of Service (minutes): 60

## 2024-12-18 ENCOUNTER — PATIENT MESSAGE (OUTPATIENT)
Dept: OBSTETRICS AND GYNECOLOGY | Facility: CLINIC | Age: 19
End: 2024-12-18
Payer: COMMERCIAL

## 2024-12-23 ENCOUNTER — PATIENT MESSAGE (OUTPATIENT)
Dept: PSYCHIATRY | Facility: CLINIC | Age: 19
End: 2024-12-23
Payer: COMMERCIAL

## 2024-12-23 ENCOUNTER — OFFICE VISIT (OUTPATIENT)
Dept: PSYCHIATRY | Facility: CLINIC | Age: 19
End: 2024-12-23
Payer: COMMERCIAL

## 2024-12-23 DIAGNOSIS — F33.1 MODERATE EPISODE OF RECURRENT MAJOR DEPRESSIVE DISORDER: Primary | ICD-10-CM

## 2024-12-23 DIAGNOSIS — F41.1 GAD (GENERALIZED ANXIETY DISORDER): ICD-10-CM

## 2024-12-23 DIAGNOSIS — F51.5 NIGHTMARES: ICD-10-CM

## 2024-12-23 DIAGNOSIS — G47.00 INSOMNIA, UNSPECIFIED TYPE: ICD-10-CM

## 2024-12-23 PROCEDURE — G2211 COMPLEX E/M VISIT ADD ON: HCPCS | Mod: 95,,, | Performed by: NURSE PRACTITIONER

## 2024-12-23 PROCEDURE — 90833 PSYTX W PT W E/M 30 MIN: CPT | Mod: 95,,, | Performed by: NURSE PRACTITIONER

## 2024-12-23 PROCEDURE — 99214 OFFICE O/P EST MOD 30 MIN: CPT | Mod: 95,,, | Performed by: NURSE PRACTITIONER

## 2024-12-23 RX ORDER — TRAZODONE HYDROCHLORIDE 50 MG/1
TABLET ORAL
Qty: 45 TABLET | Refills: 2 | Status: SHIPPED | OUTPATIENT
Start: 2024-12-23

## 2024-12-23 RX ORDER — DULOXETIN HYDROCHLORIDE 60 MG/1
60 CAPSULE, DELAYED RELEASE ORAL DAILY
Qty: 30 CAPSULE | Refills: 1 | Status: SHIPPED | OUTPATIENT
Start: 2024-12-23

## 2024-12-23 RX ORDER — CARIPRAZINE 1.5 MG/1
1.5 CAPSULE, GELATIN COATED ORAL DAILY
Qty: 30 CAPSULE | Refills: 1 | Status: SHIPPED | OUTPATIENT
Start: 2024-12-23 | End: 2025-01-22

## 2024-12-23 RX ORDER — HYDROXYZINE HYDROCHLORIDE 10 MG/1
25 TABLET, FILM COATED ORAL DAILY PRN
Qty: 30 TABLET | Refills: 1 | Status: SHIPPED | OUTPATIENT
Start: 2024-12-23 | End: 2025-01-22

## 2024-12-23 NOTE — PROGRESS NOTES
"Outpatient Psychiatry Follow-Up Visit (MD/NP)    12/23/2024     The patient location is: [facility]  The chief complaint leading to consultation is: Anxiety, Depression, OCD, Panic attacks, Eating disorder, Insomnia, & ADHD      Visit type: audiovisual    Face to Face time with patient: Started at 11:03 am and completed at 11:57  am. Time with patient 50 minutes  55 minutes of total time spent on the encounter, which includes face to face time and non-face to face time preparing to see the patient (eg, review of tests), Obtaining and/or reviewing separately obtained history, Documenting clinical information in the electronic or other health record, Independently interpreting results (not separately reported) and communicating results to the patient/family/caregiver, or Care coordination (not separately reported).         Each patient to whom he or she provides medical services by telemedicine is:  (1) informed of the relationship between the physician and patient and the respective role of any other health care provider with respect to management of the patient; and (2) notified that he or she may decline to receive medical services by telemedicine and may withdraw from such care at any time.    Notes:       Clinical Status of Patient:  Outpatient (Ambulatory)    Chief Complaint:  Leonila Singh is a 19 y.o. female who presents today for follow-up of depression, anxiety, attention problems and insomnia, panic attacks, OCD, and eating disorder .  Met with patient     Interval History and Content of Current Session:  Interim Events/Subjective Report/Content of Current Session:     Patient presented for virtual visit today. She reported joint pain allover and when asked to elaborate further she stated, "I think it's connected to being stressed out all the time". She stated she plans to see a primary care provider to address her pain to rule out any medical condition.    When asked about her mood she " "stated, "I am making it. I still have my ups and down" but overall stable and okay.    She stated she has been taking vraylar 1.5 mg po daily for one week sine she was not able to fill it earlier and stated [facility] was out and she has to call few [facility] pharmacies and finally was able to find it.  She also stated, "I don't like the way vraylar 1.5 mg daily makes me feel. I am taking it for a week now and want to give it more time".  We discussed how it takes from 4-6 weeks for Vraylar 1.5 mg daily to show efficacy.  Informed patient to communicate with us through the portal or call our office at anytime if she experiences any side or adverse effects from Vraylar and/or from any of her medications and  she agreed    She denied having any suicidal ideation and no suicide plan or intent currently. She stated she would not kill or harm herself. She had no access to guns. She stated, "I am fine today and I don't have any suicidal thoughts"    She reported poor sleep and stated, "I have been having nightmares for the past week". She stated she is still taking trazodone 50 mg po qhs prn about 3-4 nights per week, helps with sleep, gets about 6  hours of sleep, but gets nightmares. We discussed increasing trazodone to 75 mg po qhs prn if tolerated and she agreed to try it. Provided patient with CBT coping skills to manage nightmares, imagery rehearsal therapy to rewrite her nightmare storyline into a more positive or neutral version and to rehearse it to retrain her brain, cognitive restructuring by challenging her irrational thoughts and fears associated with the nightmares, reducing stress, guided imagery, and relaxation techniques.    She stated she continues to struggle at work and school because of overstimulation.  She stated she is still sees her therapist, SAEN Bonner and finds therapy to be helpful.    She reported a small amount of marijuana, (THC gummies) and stated "weed helps with my physical pain " "and it helps me with the overstimulation when I get home from a long day". She stated she is not smoking read at this time. We discussed the risks of marijuana use and encouraged abstaining from marijuana use. She stated she drinks 2-3 drinks about once a month and denied alcohol abuse or excessive use.     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 She reported exercising few days per week     She stated she gets depressed around her menstrual cycle.  Provided supportive and CBT therapy to help with depressive symptoms.    She rated her anxiety and depression at 6/10 with 10/10 being the most severe.     She reported low mood, low motivation, fatigue, and anhedonia.    In her previous visits she stated she finished ketamine treatment and didn't find it helpful in improving her depression. She completed 30 treatments TMS and found it helpful. Competed DBT, and is no longer with Dr. Angeline Chaudhry, for therapy.    We discussed the risks of drinking alcohol use including death from alcohol poisoning. We dicussed medication interaction between alcohol, marijuana, and klonopin that leads to respiratory depression and/or even death.  Patient verbalized understanding.  She is not taking klonopin at this time and is not drinking excessive amount of alcohol.    She stated she is not taking adderall at this time.    Encouraged patient to utilize her effective coping skills to manage her anxiety and panic attacks.    Patient denied auditory or visual hallucination at this time.    She denied SI/HI/AVH and no objective sings or symptoms of rafa or psychosis.     Contracted for safety and non suicide plan (contact family, suicide hotline, call 911 or go the nearest emergency room).    Patient gave the writer of this note verbal permission to speak with her mother and will have patient sign DANIKA when she comes to the office in person. " "Patient provided a verbal agreement to communicate with her mother regarding her mental health. Patient's mother requested # 90 day supply and patient's mother will monitor dispensing.       Psychiatric Review Of Systems - Is patient experiencing or having changes in:  sleep: poor due to nightmares  appetite: normal  weight: normal  energy/anergy: low  interest/pleasure/anhedonia: yes low  Motivation: low  somatic symptoms: no  anxiety/panic: yes but manageable and rated her anxiety and depression at 6/10 with 10/10 being the most severe  guilty/hopelessness: no  concentration: no  S.I.B.s/risky behavior: no  Irritability: no  Racing thoughts: no  Impulsive behaviors: no longer impulsive  Paranoia:no  AVH:no  Suicidal thoughts/plan/intent: no  SE: no  Drugs: No. See HPI  ETOH: no abuse      Information form previous encounter  Obtained and reviewed Dr. Vergara's office notes of previous medication trials as summarized below:  Zoloft 150 mg-"aggression toward parents worsened."  Lamictal- unknown dose Does not remember  Prozac- "worsened mood."  Intuniv 2 mg- no improvement  Celexa 20 mg   Risperdal .25 mg BID  Luvox CR up to 150 mg   Abilify 3 mg caused weight gain of 60 lbs per patient  Concerta- "I hated how I felt like a robot all day long when I took that!"  Contempla  Buspar  Invega 3 mg  Varylar was denied  Adderall XR caused irritability  Latuda (didn't find helpful and it made her hungry"  Topamax  Rexulti 0.5 mg daily due to cost (not covered by her insurance)  Atarax 10 mg po qhs prn for insomnia because she does not like the way she feels while taking it  Melatonin is not effective  Klonopin 0.5 mg po daily prn  Trintellix 5 mgpo daily      Psychotherapy:  Target symptoms: depression, anxiety , PMDD, and sleep problems  Why chosen therapy is appropriate versus another modality: relevant to diagnosis, patient responds to this modality, evidence based practice  Outcome monitoring methods: self-report, " observation  Therapeutic intervention type: insight oriented psychotherapy, behavior modifying psychotherapy, supportive psychotherapy  Topics discussed/themes: building skills sets for symptom management, symptom recognition  The patient's response to the intervention is accepting. The patient's progress toward treatment goals is fair.   Duration of intervention: 25 minutes.    Review of Systems   PSYCHIATRIC: Pertinant items are noted in the narrative.  CONSTITUTIONAL: No weight gain or loss.   MUSCULOSKELETAL: No pain or stiffness of the joints.  NEUROLOGIC: No weakness, sensory changes, seizures, confusion, memory loss, tremor or other abnormal movements.  ENDOCRINE: No polydipsia or polyuria.  INTEGUMENTARY: No rashes or lacerations.  EYES: No exophthalmos, jaundice or blindness.  ENT: No dizziness, tinnitus or hearing loss.  RESPIRATORY: No shortness of breath.  CARDIOVASCULAR: No tachycardia or chest pain.  GASTROINTESTINAL: No nausea, vomiting, pain, constipation or diarrhea.  GENITOURINARY: No frequency, dysuria or sexual dysfunction.  HEMATOLOGIC/LYMPHATIC: No excessive bleeding, prolonged or excessive bleeding after dental extraction/injury.  ALLERGIC/IMMUNOLOGIC: No allergic response to materials, foods or animals at this time.       Past Medical, Family and Social History: The patient's past medical, family and social history have been reviewed and updated as appropriate within the electronic medical record - see encounter notes.    Compliance: yes    Side effects: None    Risk Parameters:  Patient reports no suicidal ideation  Patient reports no homicidal ideation  Patient reports no self-injurious behavior  Patient reports no violent behavior    Exam (detailed: at least 9 elements; comprehensive: all 15 elements)   Constitutional  Vitals:  Most recent vital signs, dated greater than 90 days prior to this appointment, were reviewed.   There were no vitals filed for this visit.       General:   "unremarkable, age appropriate     Musculoskeletal  Muscle Strength/Tone:  not examined   Gait & Station:  non-ataxic     Psychiatric  Speech:  no latency; no press   Mood & Affect:  , "I am making it. I still have my ups and down"  down   Thought Process:  normal and logical   Associations:  intact   Thought Content:  normal, no suicidality, no homicidality, delusions, or paranoia   Insight:  intact, has awareness of illness   Judgement: behavior is adequate to circumstances   Orientation:  grossly intact   Memory: intact for content of interview   Language: grossly intact, able to name, able to repeat   Attention Span & Concentration:  able to focus   Fund of Knowledge:  intact and appropriate to age and level of education, familiar with aspects of current personal life     Assessment and Diagnosis   Status/Progress: Based on the examination today, the patient's problem(s) is/are improved.  New problems have not been presented today.   Co-morbidities, Diagnostic uncertainty and Lack of compliance are not complicating management of the primary condition.  There are no active rule-out diagnoses for this patient at this time.     General Impression: Stable on her current treatment and medication regimen and requested keeping the same dosages of her medications due to their effectiveness in improving her symptoms. She denied suicidal ideation and no suicide plan or intent currently. She denied any side or adverse effects to her medications at this time. 12/23/24 stable but continues to report depressive symptoms.  She also reported nightmares that interfere with her sleep.  Patient started taking Vraylar 1.5 mg p.o. daily about a week ago because different pharmacies did not have it in stock.  She plans to continue taking Vraylar 1.5 mg daily to help with her depression and will report to us any side or adverse effects and we will continue to evaluate efficacy.  We discussed increasing trazodone to 75 mg p.o. q.h.s. to " treat her insomnia as an off-label.  Provided patient with CBT skills and therapy techniques to manage her nightmares, depression, and anxiety.  Encouraged continuing psychotherapy.  Patient reported feeling hopeful and optimistic about her future and denied any suicide ideation and no suicide plan or intent.    MDD recurrent moderate    2. Anxiety disorder, unspecified type  F41.9 300.00   3. Insomnia unspecified    ICD-10-CM ICD-9-CM   4. Nightmares    Intervention/Counseling/Treatment Plan     Medication Management: Continue current medications. The risks and benefits of medication were discussed with the patient.  -Continue Cymbalta 60 mg daily for anxiety, panic attacks and depression  -Continue Vraylar 1.5 mg po daily for depression.  Has been taking it for only 1 week due to problems finding it pharmacies  -Completed TMS and Ketamine treatments by different providers in the past  -Not taking Adderall IR 10 mg daily for ADHD as needed. Not taking now because is not in school  -checked  and no signs on misuse  -Discussed medication and treatment options. Went over the risks, benefits, side effects and alternatives of taking the listed above medication.   -We discussed risk of Serotonin Syndrome including symptoms in order of appearance: diarrhea, restlessness, extreme agitation, autonomic instability, hyperthermia, rigidity, coma, and death.  -Discussed black box warning of increased Suicidal thoughts in individuals younger than 24 years old and the steps to take if patient ever experiences increased SI.   -Contracted for safety and non suicide plan (contact family, suicide hotline, call 911 or go the nearest emergency room)  -Continue Sleep hygiene and melatonin 3-5 mg po qhs prn for sleep problems  -Increase Trazodone to 75 mg po qhs prn as an off label for insomnia  -provided CBT therapy to help with nightmares, anxiety, and depressive symptoms  -Continue therapy  -Discussed informed consent, diagnosis,  risks and benefits of proposed treatment above vs alternative treatments vs no treatment, and potential side effects of these treatments. The patient expresses understanding of the above and displays the capacity to agree with this treatment given said understanding. Patient also agrees that, currently, the benefits outweigh the risks and would like to pursue treatment at this time. Answered all questions and discussed follow up. Encouraged patient to contact us with any questions or concerns.   -Counseled on the risks of alcohol use and discussed SAMSA guidelines of no more than 3 drinks per day and no more than 7 drinks per week for women  -Encouraged Patient to keep future appointments.  -Take medications as prescribed and abstain from substance abuse.  -Patient to present to Emergency Department for thoughts to harm herself or others    Return to Clinic: 2 weeks or earlier as needed     WILMER [place]-BC

## 2025-01-05 ENCOUNTER — PATIENT MESSAGE (OUTPATIENT)
Dept: PSYCHIATRY | Facility: CLINIC | Age: 20
End: 2025-01-05
Payer: COMMERCIAL

## 2025-01-06 ENCOUNTER — OFFICE VISIT (OUTPATIENT)
Dept: PSYCHIATRY | Facility: CLINIC | Age: 20
End: 2025-01-06
Payer: COMMERCIAL

## 2025-01-06 DIAGNOSIS — G47.00 INSOMNIA, UNSPECIFIED TYPE: ICD-10-CM

## 2025-01-06 DIAGNOSIS — F41.1 GAD (GENERALIZED ANXIETY DISORDER): ICD-10-CM

## 2025-01-06 DIAGNOSIS — F43.10 PTSD (POST-TRAUMATIC STRESS DISORDER): ICD-10-CM

## 2025-01-06 DIAGNOSIS — F32.81 PMDD (PREMENSTRUAL DYSPHORIC DISORDER): ICD-10-CM

## 2025-01-06 DIAGNOSIS — F33.2 SEVERE EPISODE OF RECURRENT MAJOR DEPRESSIVE DISORDER, WITHOUT PSYCHOTIC FEATURES: Primary | ICD-10-CM

## 2025-01-06 NOTE — PROGRESS NOTES
"The patient location is: [place]  The chief complaint leading to consultation is: depression    Visit type: audiovisual    Face to Face time with patient: 60  60 minutes of total time spent on the encounter, which includes face to face time and non-face to face time preparing to see the patient (eg, review of tests), Obtaining and/or reviewing separately obtained history, Documenting clinical information in the electronic or other health record, Independently interpreting results (not separately reported) and communicating results to the patient/family/caregiver, or Care coordination (not separately reported).     Each patient to whom he or she provides medical services by telemedicine is:  (1) informed of the relationship between the physician and patient and the respective role of any other health care provider with respect to management of the patient; and (2) notified that he or she may decline to receive medical services by telemedicine and may withdraw from such care at any time.    Notes:     Individual Psychotherapy (PhD/LCSW)    1/6/2025    Site:  Telemed         Therapeutic Intervention: Met with patient.  Outpatient - Behavior modifying psychotherapy  60 min - CPT code 78126    Chief complaint/reason for encounter: depression, anxiety, and behavior     Interval history and content of current session:   Pt presented virtually for a follow up visit.   Pt reported that she is doing "better this week"  "[place] was better than I thought it would be"  Wokred on ACT framework  Client's Values:  Protecting others  Maintaining health  Capability to engage in meaningful activities  Creativity and self-expression  Kindness and advocacy for human rights  Spirituality and belief systems  Security and stability  Freedom  Relationships  Presenting Concerns (Pain):  Intensity of emotions  Feelings of hopelessness and isolation  Difficulty interacting and forming connections  Persistent belief of I can't do " "right  Fear of rejection or abandonment upon vulnerability  Patterns of avoidance contributing to feeling "stuck" and disengaged from life  Avoidance Behaviors:  Writing as a form of emotional avoidance rather than expression  Isolation and self-reliance to solve problems  Possible eating restriction or substance use (e.g., weed) as unaddressed coping mechanisms  Avoidance results in lack of presence, missed opportunities for connection, and continued emotional distress    Treatment Goals:  Increase Psychological Flexibility:  Develop willingness to experience emotional pain without avoidance.  Foster acceptance of internal experiences while committing to values-based actions.  Clarify and Pursue Values:  Align daily actions with identified values, such as protecting others, engaging in creativity, and building secure, meaningful relationships.  Address Avoidance:  Identify the costs of avoidance strategies.  Develop alternative coping strategies that enhance presence and engagement.  Enhance Emotional Regulation:  Build tolerance for intense emotions without becoming overwhelmed or acting impulsively.  Foster Connection:  Create opportunities for deeper relationships by taking small, values-driven risks in vulnerability.    Treatment plan:  Target symptoms: depression, lack of focus, anxiety , adjustment  Why chosen therapy is appropriate versus another modality: relevant to diagnosis, patient responds to this modality, evidence based practice  Outcome monitoring methods: self-report, observation, feedback from family  Therapeutic intervention type: behavior modifying psychotherapy    Risk parameters:  Patient reports no suicidal ideation  Patient reports no homicidal ideation  Patient reports no self-injurious behavior  Patient reports no violent behavior    Verbal deficits: None    Patient's response to intervention:  The patient's response to intervention is accepting.    Progress toward goals and other mental " status changes:  The patient's progress toward goals is limited.    Diagnosis:     ICD-10-CM ICD-9-CM   1. Severe episode of recurrent major depressive disorder, without psychotic features  F33.2 296.33   2. PTSD (post-traumatic stress disorder)  F43.10 309.81   3. PMDD (premenstrual dysphoric disorder)  F32.81 625.4   4. JEANETTE (generalized anxiety disorder)  F41.1 300.02   5. Insomnia, unspecified type  G47.00 780.52       Plan:  individual psychotherapy    Return to clinic: as scheduled    Length of Service (minutes): 60

## 2025-01-09 ENCOUNTER — OFFICE VISIT (OUTPATIENT)
Dept: INTERNAL MEDICINE | Facility: CLINIC | Age: 20
End: 2025-01-09
Payer: COMMERCIAL

## 2025-01-09 VITALS
BODY MASS INDEX: 30.53 KG/M2 | OXYGEN SATURATION: 98 % | DIASTOLIC BLOOD PRESSURE: 88 MMHG | HEART RATE: 72 BPM | HEIGHT: 64 IN | WEIGHT: 178.81 LBS | SYSTOLIC BLOOD PRESSURE: 118 MMHG

## 2025-01-09 DIAGNOSIS — M25.50 ARTHRALGIA, UNSPECIFIED JOINT: ICD-10-CM

## 2025-01-09 DIAGNOSIS — F33.2 SEVERE EPISODE OF RECURRENT MAJOR DEPRESSIVE DISORDER, WITHOUT PSYCHOTIC FEATURES: ICD-10-CM

## 2025-01-09 DIAGNOSIS — F32.81 PMDD (PREMENSTRUAL DYSPHORIC DISORDER): ICD-10-CM

## 2025-01-09 DIAGNOSIS — F41.1 GENERALIZED ANXIETY DISORDER: ICD-10-CM

## 2025-01-09 DIAGNOSIS — F43.10 PTSD (POST-TRAUMATIC STRESS DISORDER): Primary | ICD-10-CM

## 2025-01-09 DIAGNOSIS — M25.40 JOINT SWELLING: ICD-10-CM

## 2025-01-09 PROCEDURE — 99999 PR PBB SHADOW E&M-EST. PATIENT-LVL IV: CPT | Mod: PBBFAC,,, | Performed by: NURSE PRACTITIONER

## 2025-01-09 NOTE — PROGRESS NOTES
Internal Medicine Annual Exam       CHIEF COMPLAINT     The patient, Leonila Singh, who is a 19 y.o. female with JEANETTE, PTSD, MDD, obesity, fatigue and joint pain presents for an annual exam.    HPI     History of Present Illness    CHIEF COMPLAINT:  Patient presents today with joint pain flare-ups.    JOINT PAIN AND PMDD:  She reports joint pain affecting her back, knees, hands, and other joints that began several years ago with worsening over the past year. The joints become swollen, red, and painful, both at rest and with movement. Flare-ups occur monthly, typically before menstruation, and are exacerbated by stress and emotional distress. Exercise sometimes alleviates the pain but can also worsen it. The severity has necessitated time off work. Her PMDD symptoms intensify the joint pain to debilitating levels during these periods.    CURRENT SYMPTOMS:  She experiences chronic fatigue, occasional chest pain which she attributes to anxiety, and intermittent shortness of breath. She reports frequent nausea and diffuse abdominal pain associated with IBS.    MEDICATIONS:  She currently takes Vraylar, Adderall, Cymbalta, Hydroxyzine as needed, and Trazodone.    PAST MEDICAL HISTORY:  She has a history of OCD, ADHD, depression, anxiety, and PTSD.    SURGICAL HISTORY:  Past surgeries include colonoscopy, EGD, tonsillectomy, and wisdom teeth extraction.    FAMILY HISTORY:  Mother had PCOS, maternal grandmother had breast cancer, sister had childhood asthma. Paternal grandfather had atrial fibrillation and breathing issues. Family history includes diabetes on paternal side, heart disease in paternal grandfather, and hypertension in both grandfathers.    SOCIAL HISTORY:  She consumes alcohol 2-3 drinks, 2-3 times monthly and uses marijuana 3-4 times weekly.      ROS:  General: -fever, -chills, +fatigue, -weight gain, -weight loss  Eyes: -vision changes, -redness, -discharge  ENT: -ear pain, -nasal congestion, -sore  throat  Cardiovascular: +chest pain, -palpitations, -lower extremity edema  Respiratory: -cough, +shortness of breath  Gastrointestinal: +abdominal pain, +nausea, -vomiting, -diarrhea, -constipation, -blood in stool  Genitourinary: -dysuria, -hematuria, -frequency  Musculoskeletal: +joint pain, -muscle pain, +joint swelling  Skin: -rash, -lesion  Neurological: -headache, -dizziness, -numbness, -tingling  Psychiatric: -anxiety, -depression, -sleep difficulty         Past Medical History:  Past Medical History:   Diagnosis Date    ADHD     Depression     OCD (obsessive compulsive disorder)        Past Surgical History:   Procedure Laterality Date    COLONOSCOPY N/A 11/24/2020    Procedure: COLONOSCOPY;  Surgeon: Estrella Smith MD;  [facility]  Service: Endoscopy;  Laterality: N/A;    ESOPHAGOGASTRODUODENOSCOPY N/A 11/24/2020    Procedure: (EGD);  Surgeon: Estrella Smith MD;  Location[facility]  Service: Endoscopy;  Laterality: N/A;  covid test 11/21    TONSILLECTOMY          Family History   Problem Relation Name Age of Onset    No Known Problems Mother      No Known Problems Father      No Known Problems Sister      No Known Problems Brother      Diabetes Paternal Grandmother          Social History     Socioeconomic History    Marital status: Single   Tobacco Use    Smoking status: Never    Smokeless tobacco: Never   Substance and Sexual Activity    Alcohol use: Not Currently    Drug use: Yes     Types: Marijuana    Sexual activity: Never     Birth control/protection: None   Other Topics Concern    Patient feels they ought to cut down on drinking/drug use Yes    Patient annoyed by others criticizing their drinking/drug use No    Patient has felt bad or guilty about drinking/drug use Yes    Patient has had a drink/used drugs as an eye opener in the AM No   Social History Narrative    Lives with parents and siblings (4 siblings, is a triplet (oldest by 1 min) and has 1 younger  sibling). 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 Grades good     Social Drivers of Health     Financial Resource Strain: Low Risk  (10/21/2024)    Overall Financial Resource Strain (CARDIA)     Difficulty of Paying Living Expenses: Not very hard   Food Insecurity: No Food Insecurity (10/21/2024)    Hunger Vital Sign     Worried About Running Out of Food in the Last Year: Never true     Ran Out of Food in the Last Year: Never true   Transportation Needs: No Transportation Needs (3/6/2024)    Received from [facility]     Lack of Transportation (Medical): No     Lack of Transportation (Non-Medical): No   Physical Activity: Sufficiently Active (10/21/2024)    Exercise Vital Sign     Days of Exercise per Week: 3 days     Minutes of Exercise per Session: 70 min   Stress: Stress Concern Present (10/21/2024)    [employer] - Occupational Stress Questionnaire     Feeling of Stress : Very much   Housing Stability: Unknown (10/21/2024)    Housing Stability Vital Sign     Unable to Pay for Housing in the Last Year: Patient declined        Social History     Tobacco Use   Smoking Status Never   Smokeless Tobacco Never        Allergies as of 01/09/2025    (No Known Allergies)          Home Medications:  Prior to Admission medications    Medication Sig Start Date End Date Taking? Authorizing Provider   cariprazine (VRAYLAR) 1.5 mg Cap Take 1 capsule (1.5 mg total) by mouth once daily. 12/23/24 1/22/25  Mely Lama NP   dextroamphetamine-amphetamine (ADDERALL XR) 10 MG 24 hr capsule Take one tab po qam and one tab po qnoon 8/14/22   Mely Lama NP   DULoxetine (CYMBALTA) 60 MG capsule Take 1 capsule (60 mg total) by mouth once daily. 12/23/24   Mely Lama NP   hydrOXYzine HCL (ATARAX) 10 MG Tab Take 3 tablets (30 mg total) by mouth daily as needed (off label for anxiety). 12/23/24 1/22/25  Mely Lama NP   leuprolide (LUPRON) 3.75 mg injection Inject 3.75 mg into the muscle every  28 days. 8/24/22   Margo Cheung MD   polyethylene glycol (MIRALAX) 17 gram/dose powder Take 17 g by mouth once daily. 11/4/24   Gabriela Jack DO   traZODone (DESYREL) 50 MG tablet Take one tab and half of trazodone 50 mg po qhs prn, total 75 mg po qhs prn for insomnia 12/23/24   Mely Lama, MECHELLE       Review of Systems:  Review of Systems   Constitutional:  Negative for chills, fatigue, fever and unexpected weight change.   HENT:  Negative for congestion, hearing loss, rhinorrhea and sinus pressure.    Eyes:  Negative for pain, redness and visual disturbance.   Respiratory:  Negative for cough and shortness of breath.    Cardiovascular:  Negative for chest pain and palpitations.   Gastrointestinal:  Negative for abdominal distention, abdominal pain, constipation, diarrhea, nausea and vomiting.   Endocrine: Negative for polydipsia, polyphagia and polyuria.   Genitourinary:  Negative for dysuria, frequency, urgency and vaginal discharge.   Musculoskeletal:  Negative for arthralgias, gait problem and myalgias.   Skin:  Negative for color change and rash.   Allergic/Immunologic: Negative for environmental allergies and immunocompromised state.   Neurological:  Negative for dizziness, weakness, light-headedness and headaches.   Hematological:  Negative for adenopathy. Does not bruise/bleed easily.   Psychiatric/Behavioral:  Negative for confusion and sleep disturbance. The patient is not nervous/anxious.        Health Maintainence:   Immunizations:  Health Maintenance         Date Due Completion Date    Hepatitis C Screening Never done ---    HIV Screening Never done ---    Influenza Vaccine (1) 09/01/2024 10/1/2021    COVID-19 Vaccine (2 - 2024-25 season) 09/01/2024 1/6/2022    TETANUS VACCINE 04/14/2025 4/14/2015    RSV Vaccine (Age 60+ and Pregnant patients) (1 - 1-dose 75+ series) 02/27/2079 ---             PHYSICAL EXAM     There were no vitals taken for this visit. There is no height or weight on  file to calculate BMI.    Physical Exam  Vitals reviewed.   Constitutional:       Appearance: She is well-developed.   HENT:      Head: Normocephalic.      Right Ear: External ear normal.      Left Ear: External ear normal.      Nose: Nose normal.      Mouth/Throat:      Pharynx: No oropharyngeal exudate.   Eyes:      Pupils: Pupils are equal, round, and reactive to light.   Neck:      Thyroid: No thyromegaly.      Vascular: No JVD.      Trachea: No tracheal deviation.   Cardiovascular:      Rate and Rhythm: Normal rate and regular rhythm.      Heart sounds: Normal heart sounds. No murmur heard.     No friction rub. No gallop.   Pulmonary:      Effort: Pulmonary effort is normal. No respiratory distress.      Breath sounds: Normal breath sounds. No wheezing or rales.   Abdominal:      General: Bowel sounds are normal. There is no distension.      Palpations: Abdomen is soft.      Tenderness: There is no abdominal tenderness.   Musculoskeletal:         General: No tenderness. Normal range of motion.      Cervical back: Neck supple.        Back:         Legs:    Lymphadenopathy:      Cervical: No cervical adenopathy.   Skin:     General: Skin is warm and dry.      Findings: No rash.   Neurological:      Mental Status: She is alert and oriented to person, place, and time.      Cranial Nerves: Cranial nerves 2-12 are intact.      Motor: Motor function is intact. No weakness.      Gait: Gait is intact.   Psychiatric:         Behavior: Behavior normal.         LABS     Lab Results   Component Value Date    HGBA1C 5.4 06/11/2021     CMP  Sodium   Date Value Ref Range Status   11/04/2024 140 136 - 145 mmol/L Final     Potassium   Date Value Ref Range Status   11/04/2024 4.1 3.5 - 5.1 mmol/L Final     Chloride   Date Value Ref Range Status   11/04/2024 107 95 - 110 mmol/L Final     CO2   Date Value Ref Range Status   11/04/2024 20 (L) 23 - 29 mmol/L Final     Glucose   Date Value Ref Range Status   11/04/2024 102 70 - 110  mg/dL Final     BUN   Date Value Ref Range Status   11/04/2024 13 6 - 20 mg/dL Final     Creatinine   Date Value Ref Range Status   11/04/2024 0.8 0.5 - 1.4 mg/dL Final     Calcium   Date Value Ref Range Status   11/04/2024 9.1 8.7 - 10.5 mg/dL Final     Total Protein   Date Value Ref Range Status   11/04/2024 7.2 6.0 - 8.4 g/dL Final     Albumin   Date Value Ref Range Status   11/04/2024 4.2 3.5 - 5.2 g/dL Final     Total Bilirubin   Date Value Ref Range Status   11/04/2024 0.3 0.1 - 1.0 mg/dL Final     Comment:     For infants and newborns, interpretation of results should be based  on gestational age, weight and in agreement with clinical  observations.    Premature Infant recommended reference ranges:  Up to 24 hours.............<8.0 mg/dL  Up to 48 hours............<12.0 mg/dL  3-5 days..................<15.0 mg/dL  6-29 days.................<15.0 mg/dL       Alkaline Phosphatase   Date Value Ref Range Status   11/04/2024 72 40 - 150 U/L Final     AST   Date Value Ref Range Status   11/04/2024 15 10 - 40 U/L Final     ALT   Date Value Ref Range Status   11/04/2024 14 10 - 44 U/L Final     Anion Gap   Date Value Ref Range Status   11/04/2024 13 8 - 16 mmol/L Final     eGFR if    Date Value Ref Range Status   06/25/2021 SEE COMMENT >60 mL/min/1.73 m^2 Final     eGFR if non    Date Value Ref Range Status   06/25/2021 SEE COMMENT >60 mL/min/1.73 m^2 Final     Comment:     Calculation used to obtain the estimated glomerular filtration  rate (eGFR) is the CKD-EPI equation.   Test not performed.  GFR calculation is only valid for patients   18 and older.       Lab Results   Component Value Date    WBC 5.13 11/04/2024    HGB 12.6 11/04/2024    HCT 39.5 11/04/2024    MCV 86 11/04/2024     11/04/2024     Lab Results   Component Value Date    CHOL 173 06/11/2021    CHOL 202 (H) 07/30/2020     Lab Results   Component Value Date    HDL 58 06/11/2021    HDL 53 07/30/2020     Lab Results    Component Value Date    LDLCALC 102.2 06/11/2021    LDLCALC 139.0 07/30/2020     Lab Results   Component Value Date    TRIG 64 06/11/2021    TRIG 50 07/30/2020     Lab Results   Component Value Date    CHOLHDL 33.5 06/11/2021    CHOLHDL 26.2 07/30/2020     Lab Results   Component Value Date    TSH 1.298 11/04/2024       ASSESSMENT/PLAN     Leonila Singh is a 19 y.o. female    PTSD (post-traumatic stress disorder)    Severe episode of recurrent major depressive disorder, without psychotic features    Generalized anxiety disorder    PMDD (premenstrual dysphoric disorder)    Arthralgia, unspecified joint- will send for autoimmune labs and f/u with rheum   -     ROZ Screen w/Reflex; Future; Expected date: 01/09/2025  -     Sedimentation rate; Future; Expected date: 01/09/2025  -     C-Reactive Protein; Future; Expected date: 01/09/2025  -     CYCLIC CITRUL PEPTIDE ANTIBODY, IGG; Future; Expected date: 01/09/2025  -     RHEUMATOID FACTOR; Future; Expected date: 01/09/2025  -     URIC ACID; Future; Expected date: 01/09/2025    Joint swelling will send for autoimmune labs and f/u with rheum   -     ROZ Screen w/Reflex; Future; Expected date: 01/09/2025  -     Sedimentation rate; Future; Expected date: 01/09/2025  -     C-Reactive Protein; Future; Expected date: 01/09/2025  -     CYCLIC CITRUL PEPTIDE ANTIBODY, IGG; Future; Expected date: 01/09/2025  -     RHEUMATOID FACTOR; Future; Expected date: 01/09/2025  -     URIC ACID; Future; Expected date: 01/09/2025       This note was generated with the assistance of ambient listening technology. Verbal consent was obtained by the patient and accompanying visitor(s) for the recording of patient appointment to facilitate this note. I attest to having reviewed and edited the generated note for accuracy, though some syntax or spelling errors may persist. Please contact the author of this note for any clarification.    Follow up with PCP     Yuridia HICKMAN,  Ross SHANE   Department of Internal Medicine - [facility]  11:01 AM

## 2025-01-13 ENCOUNTER — OFFICE VISIT (OUTPATIENT)
Dept: PSYCHIATRY | Facility: CLINIC | Age: 20
End: 2025-01-13
Payer: COMMERCIAL

## 2025-01-13 ENCOUNTER — LAB VISIT (OUTPATIENT)
Dept: LAB | Facility: HOSPITAL | Age: 20
End: 2025-01-13
Attending: NURSE PRACTITIONER
Payer: COMMERCIAL

## 2025-01-13 DIAGNOSIS — M25.40 JOINT SWELLING: ICD-10-CM

## 2025-01-13 DIAGNOSIS — F43.10 PTSD (POST-TRAUMATIC STRESS DISORDER): Primary | ICD-10-CM

## 2025-01-13 DIAGNOSIS — F33.2 SEVERE EPISODE OF RECURRENT MAJOR DEPRESSIVE DISORDER, WITHOUT PSYCHOTIC FEATURES: ICD-10-CM

## 2025-01-13 DIAGNOSIS — M25.50 ARTHRALGIA, UNSPECIFIED JOINT: ICD-10-CM

## 2025-01-13 DIAGNOSIS — G47.00 INSOMNIA, UNSPECIFIED TYPE: ICD-10-CM

## 2025-01-13 DIAGNOSIS — F51.5 NIGHTMARES: ICD-10-CM

## 2025-01-13 DIAGNOSIS — F32.81 PMDD (PREMENSTRUAL DYSPHORIC DISORDER): ICD-10-CM

## 2025-01-13 DIAGNOSIS — F41.1 GAD (GENERALIZED ANXIETY DISORDER): ICD-10-CM

## 2025-01-13 DIAGNOSIS — F33.1 MODERATE EPISODE OF RECURRENT MAJOR DEPRESSIVE DISORDER: ICD-10-CM

## 2025-01-13 LAB
CCP AB SER IA-ACNC: 0.7 U/ML
CRP SERPL-MCNC: 0.4 MG/L (ref 0–8.2)
ERYTHROCYTE [SEDIMENTATION RATE] IN BLOOD BY PHOTOMETRIC METHOD: 5 MM/HR (ref 0–36)
RHEUMATOID FACT SERPL-ACNC: <13 IU/ML (ref 0–15)
URATE SERPL-MCNC: 5.4 MG/DL (ref 2.4–5.7)

## 2025-01-13 PROCEDURE — 86200 CCP ANTIBODY: CPT | Performed by: NURSE PRACTITIONER

## 2025-01-13 PROCEDURE — 84550 ASSAY OF BLOOD/URIC ACID: CPT | Performed by: NURSE PRACTITIONER

## 2025-01-13 PROCEDURE — 86039 ANTINUCLEAR ANTIBODIES (ANA): CPT | Performed by: NURSE PRACTITIONER

## 2025-01-13 PROCEDURE — 86431 RHEUMATOID FACTOR QUANT: CPT | Performed by: NURSE PRACTITIONER

## 2025-01-13 PROCEDURE — 86140 C-REACTIVE PROTEIN: CPT | Performed by: NURSE PRACTITIONER

## 2025-01-13 PROCEDURE — 86038 ANTINUCLEAR ANTIBODIES: CPT | Performed by: NURSE PRACTITIONER

## 2025-01-13 PROCEDURE — 36415 COLL VENOUS BLD VENIPUNCTURE: CPT | Performed by: NURSE PRACTITIONER

## 2025-01-13 PROCEDURE — 85652 RBC SED RATE AUTOMATED: CPT | Performed by: NURSE PRACTITIONER

## 2025-01-13 PROCEDURE — 90837 PSYTX W PT 60 MINUTES: CPT | Mod: 95,,,

## 2025-01-13 PROCEDURE — 86235 NUCLEAR ANTIGEN ANTIBODY: CPT | Mod: 59 | Performed by: NURSE PRACTITIONER
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
## 2025-01-15 ENCOUNTER — OFFICE VISIT (OUTPATIENT)
Dept: PSYCHIATRY | Facility: CLINIC | Age: 20
End: 2025-01-15
Payer: COMMERCIAL

## 2025-01-15 ENCOUNTER — PATIENT MESSAGE (OUTPATIENT)
Dept: PSYCHIATRY | Facility: CLINIC | Age: 20
End: 2025-01-15
Payer: COMMERCIAL

## 2025-01-15 VITALS
BODY MASS INDEX: 30.94 KG/M2 | WEIGHT: 180.25 LBS | DIASTOLIC BLOOD PRESSURE: 54 MMHG | HEART RATE: 81 BPM | SYSTOLIC BLOOD PRESSURE: 106 MMHG

## 2025-01-15 DIAGNOSIS — F41.1 GAD (GENERALIZED ANXIETY DISORDER): ICD-10-CM

## 2025-01-15 DIAGNOSIS — G47.00 INSOMNIA, UNSPECIFIED TYPE: ICD-10-CM

## 2025-01-15 DIAGNOSIS — F33.1 MODERATE EPISODE OF RECURRENT MAJOR DEPRESSIVE DISORDER: Primary | ICD-10-CM

## 2025-01-15 LAB
ANA PATTERN 1: NORMAL
ANA SER QL IF: POSITIVE
ANA TITR SER IF: NORMAL {TITER}

## 2025-01-15 PROCEDURE — G2211 COMPLEX E/M VISIT ADD ON: HCPCS | Mod: S$GLB,,, | Performed by: NURSE PRACTITIONER

## 2025-01-15 PROCEDURE — 99214 OFFICE O/P EST MOD 30 MIN: CPT | Mod: S$GLB,,, | Performed by: NURSE PRACTITIONER

## 2025-01-15 PROCEDURE — 99999 PR PBB SHADOW E&M-EST. PATIENT-LVL II: CPT | Mod: PBBFAC,,, | Performed by: NURSE PRACTITIONER

## 2025-01-15 NOTE — PROGRESS NOTES
"Outpatient Psychiatry Follow-Up Visit (MD/NP)    1/15/2025     The patient location is: [place], [place]  The chief complaint leading to consultation is: Anxiety, Depression, OCD, Panic attacks, Eating disorder, Insomnia, & ADHD      Notes:       Clinical Status of Patient:  Outpatient (Ambulatory)    Chief Complaint:  Leonila Singh is a 19 y.o. female who presents today for follow-up of depression, anxiety, attention problems and insomnia, panic attacks, OCD, and eating disorder .  Met with patient     Interval History and Content of Current Session:  Interim Events/Subjective Report/Content of Current Session:     Patient presented 21 minutes late for her 30 minute scheduled appointment and was able to see for 39 minutes because the 10:00 am appointment was a now show. She reported feelings of  restlessness and mild muscle twitching when she was taking vraylar 3 mg daily and 1.5 mg po daily but not when she is taking vraylar 1.5 mg po daily every other day. We discussed other treatment options like trintellix to treat her depression but stated she is concerned about inducing rafa. We did MDQ and scored 11 and answered yes to more than 7 questions, and yes for question # 2, and moderate problem to question # 3. When asked when the last time she had these symptoms she reported in high school. Since, I have been seeing the patient I have not witnessed signs or symptoms of rafa or hypomania but have noticed and observed depressive symptoms. Will collaborate with patient's therapist for monitoring or rafa or hypomania like symptoms.     She reported joint pain allover, had test to rule out immune system problems and all negative. She stated "I have PMDD that affects my mood".     She stated she had episodes of hypomania and when asked to elaborate she stated "I can be very reckless. I would do things that I would not normally do. I get risky with drugs, alcohol, money, and sex". She stated these " "episodes could happen around her menstrual cycle and thinks they happen once every three months and they last about 2-3 days. She denied being hyperverbal but reported losing sleep, mild increase in energy, and racing thoughts during these episodes. She denied rafa like symptoms since high school.       When asked about her mood she stated, "okay" with her current medication regimen and wants to keep the same dosages. Patient stated she is stable and doing okay.    Informed patient to communicate with us through the portal or call our office at anytime if she experiences any side or adverse effects from Vraylar and/or from any of her medications and she agreed    She denied having any suicidal ideation and no suicide plan or intent currently or recently. She stated she would not kill or harm herself. She had no access to guns. She stated, "I am fine today and I don't have any suicidal thoughts"    She reported good sleep and stated, with trazodone 75 mg po qhs prn, gets about 7 hours of sleep per night, tolerating it well    Patient reported improved nightmares. Encouraged imagery rehearsal therapy to rewrite her nightmare storyline into a more positive or neutral version and to rehearse it to retrain her brain, cognitive restructuring by challenging her irrational thoughts and fears associated with the nightmares, reducing stress, guided imagery, and relaxation techniques.    She stated she is working now and likes her job and is taking time off school.    She stated she is still sees her therapist, SAEN Bonner and finds therapy to be very helpful.    She reported a small amount of marijuana, (THC gummies) and stated "weed helps with my physical pain and it helps me with the overstimulation when I get home from a long day". She stated she is not smoking read at this time. We discussed the risks of marijuana use and encouraged abstaining from marijuana use. She stated she drinks 2-3 drinks about once a month and " denied alcohol abuse or excessive use.     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 She reported exercising few days per week     She stated she gets depressed around her menstrual cycle.  Provided supportive and CBT therapy to help with depressive symptoms.    She rated her anxiety and depression at 6/10 with 10/10 being the most severe.     In her previous visits she stated she finished ketamine treatment and didn't find it helpful in improving her depression. She completed 30 treatments TMS and found it helpful. Competed DBT, and is no longer with Dr. Angeline Chaudhry, for therapy.    We discussed the risks of drinking alcohol use including death from alcohol poisoning. We dicussed medication interaction between alcohol, marijuana, and klonopin that leads to respiratory depression and/or even death.  Patient verbalized understanding.  She is not taking klonopin at this time and is not drinking an excessive amount of alcohol.    She stated she is not taking adderall at this time.    Encouraged patient to utilize her effective coping skills to manage her anxiety and panic attacks.    Patient denied auditory or visual hallucination at this time.    She denied SI/HI/AVH and no objective sings or symptoms of rafa or psychosis.     Contracted for safety and non suicide plan (contact family, suicide hotline, call 911 or go the nearest emergency room).    Patient gave the writer of this note verbal permission to speak with her mother. Patient provided a verbal agreement to communicate with her mother regarding her mental health. Patient's mother requested # 90 day supply and patient's mother will monitor dispensing.       Psychiatric Review Of Systems - Is patient experiencing or having changes in:  sleep: improving  appetite: normal  weight: normal  energy/anergy: improving  interest/pleasure/anhedonia: improving  Motivation:  "improving  somatic symptoms: no  anxiety/panic: yes but manageable and rated her anxiety and depression at 6/10 with 10/10 being the most severe  guilty/hopelessness: no  concentration: no  S.I.B.s/risky behavior: no  Irritability: no  Racing thoughts: no  Impulsive behaviors: no longer impulsive  Paranoia:no  AVH:no  Suicidal thoughts/plan/intent: no  SE: no  Drugs: No. See HPI  ETOH: no abuse      Information form previous encounter  Obtained and reviewed Dr. Vergara's office notes of previous medication trials as summarized below:  Zoloft 150 mg-"aggression toward parents worsened."  Lamictal- unknown dose Does not remember  Prozac- "worsened mood."  Intuniv 2 mg- no improvement  Celexa 20 mg   Risperdal .25 mg BID  Luvox CR up to 150 mg   Abilify 3 mg caused weight gain of 60 lbs per patient  Concerta- "I hated how I felt like a robot all day long when I took that!"  Contempla  Buspar  Invega 3 mg  Varylar was denied  Adderall XR caused irritability  Latuda (didn't find helpful and it made her hungry"  Topamax  Rexulti 0.5 mg daily due to cost (not covered by her insurance)  Atarax 10 mg po qhs prn for insomnia because she does not like the way she feels while taking it  Melatonin is not effective  Klonopin 0.5 mg po daily prn  Trintellix 5 mgpo daily      Psychotherapy:  Target symptoms: depression, anxiety , PMDD, and sleep problems  Why chosen therapy is appropriate versus another modality: relevant to diagnosis, patient responds to this modality, evidence based practice  Outcome monitoring methods: self-report, observation  Therapeutic intervention type: insight oriented psychotherapy, behavior modifying psychotherapy, supportive psychotherapy  Topics discussed/themes: building skills sets for symptom management, symptom recognition  The patient's response to the intervention is accepting. The patient's progress toward treatment goals is fair.   Duration of intervention: 20 minutes.    Review of Systems " "  PSYCHIATRIC: Pertinant items are noted in the narrative.  CONSTITUTIONAL: No weight gain or loss.   MUSCULOSKELETAL: No pain or stiffness of the joints.  NEUROLOGIC: No weakness, sensory changes, seizures, confusion, memory loss, tremor or other abnormal movements.  ENDOCRINE: No polydipsia or polyuria.  INTEGUMENTARY: No rashes or lacerations.  EYES: No exophthalmos, jaundice or blindness.  ENT: No dizziness, tinnitus or hearing loss.  RESPIRATORY: No shortness of breath.  CARDIOVASCULAR: No tachycardia or chest pain.  GASTROINTESTINAL: No nausea, vomiting, pain, constipation or diarrhea.  GENITOURINARY: No frequency, dysuria or sexual dysfunction.  HEMATOLOGIC/LYMPHATIC: No excessive bleeding, prolonged or excessive bleeding after dental extraction/injury.  ALLERGIC/IMMUNOLOGIC: No allergic response to materials, foods or animals at this time.       Past Medical, Family and Social History: The patient's past medical, family and social history have been reviewed and updated as appropriate within the electronic medical record - see encounter notes.    Compliance: yes    Side effects: None    Risk Parameters:  Patient reports no suicidal ideation  Patient reports no homicidal ideation  Patient reports no self-injurious behavior  Patient reports no violent behavior    Exam (detailed: at least 9 elements; comprehensive: all 15 elements)   Constitutional  Vitals:  Most recent vital signs, dated greater than 90 days prior to this appointment, were reviewed.   Vitals:    01/15/25 0954   BP: (!) 106/54   Pulse: 81   Weight: 81.8 kg (180 lb 3.6 oz)          General:  unremarkable, age appropriate     Musculoskeletal  Muscle Strength/Tone:  not examined   Gait & Station:  non-ataxic     Psychiatric  Speech:  no latency; no press   Mood & Affect:  , "depends on the day and not bad today"  appropriate   Thought Process:  normal and logical   Associations:  intact   Thought Content:  normal, no suicidality, no homicidality, " delusions, or paranoia   Insight:  intact, has awareness of illness   Judgement: behavior is adequate to circumstances   Orientation:  grossly intact   Memory: intact for content of interview   Language: grossly intact, able to name, able to repeat   Attention Span & Concentration:  able to focus   Fund of Knowledge:  intact and appropriate to age and level of education, familiar with aspects of current personal life     Assessment and Diagnosis   Status/Progress: Based on the examination today, the patient's problem(s) is/are improved.  New problems have not been presented today.   Co-morbidities, Diagnostic uncertainty and Lack of compliance are not complicating management of the primary condition.  There are no active rule-out diagnoses for this patient at this time.     General Impression: Stable on her current treatment and medication regimen and requested keeping the same dosages of her medications due to their effectiveness in improving her symptoms. She denied suicidal ideation and no suicide plan or intent currently. She denied any side or adverse effects to her medications at this time. 12/23/24 stable but continues to report depressive symptoms.  She also reported nightmares that interfere with her sleep.  Patient started taking Vraylar 1.5 mg p.o. daily about a week ago because different pharmacies did not have it in stock.  She plans to continue taking Vraylar 1.5 mg daily to help with her depression and will report to us any side or adverse effects and we will continue to evaluate efficacy.  We discussed increasing trazodone to 75 mg p.o. q.h.s. to treat her insomnia as an off-label.  Provided patient with CBT skills and therapy techniques to manage her nightmares, depression, and anxiety.  Encouraged continuing psychotherapy.  Patient reported feeling hopeful and optimistic about her future and denied any suicide ideation and no suicide plan or intent. 1/12/25 stable and doing okay with her current  medication regimen of vraylar 1.5 mg po every other day due to increased restlessness when she took it daily. She takes Cymbalta 60 mg po daily and finds it helpful. She reported good sleep with trazodone 75 mg po qhs prn. She reported managed anxiety, mood, sleep, and depression at this time. No side or adverse effects noted or reported. No suicide ideation, plan, or intent.  We will continue to monitor patient's symptoms her any rafa or hypomania like symptoms and adjust diagnosis if needed.  In the meantime will consult with patient's therapist and patient gave permission to speak to her therapist.    MDD recurrent moderate    2. Anxiety disorder, unspecified type  F41.9 300.00   3. Insomnia unspecified    ICD-10-CM ICD-9-CM   4. Nightmares    Intervention/Counseling/Treatment Plan     Medication Management: Continue current medications. The risks and benefits of medication were discussed with the patient.  -Continue Cymbalta 60 mg daily for anxiety, panic attacks and depression  -Continue Vraylar 1.5 mg po takes it every other day for depression and mood stabilization.  Fill restless when she took it daily  -Completed TMS and Ketamine treatments by different providers in the past  -Not taking Adderall IR 10 mg daily for ADHD as needed. Not taking now because she is not in school  -checked  and no signs on misuse  -Discussed medication and treatment options. Went over the risks, benefits, side effects and alternatives of taking the listed above medication.   -We discussed risk of Serotonin Syndrome including symptoms in order of appearance: diarrhea, restlessness, extreme agitation, autonomic instability, hyperthermia, rigidity, coma, and death.  -Discussed black box warning of increased Suicidal thoughts in individuals younger than 24 years old and the steps to take if patient ever experiences increased SI.   -Contracted for safety and non suicide plan (contact family, suicide hotline, call 911 or go the  nearest emergency room)  -Continue Sleep hygiene and melatonin 3-5 mg po qhs prn for sleep problems  -Continue Trazodone 75 mg po qhs prn as an off label for insomnia  -provided CBT therapy to help with nightmares, anxiety, and depressive symptoms  -Takes atarax 10 mg po daily prn to help with anxiety and panic attacks  -Continue therapy  -Discussed informed consent, diagnosis, risks and benefits of proposed treatment above vs alternative treatments vs no treatment, and potential side effects of these treatments. The patient expresses understanding of the above and displays the capacity to agree with this treatment given said understanding. Patient also agrees that, currently, the benefits outweigh the risks and would like to pursue treatment at this time. Answered all questions and discussed follow up. Encouraged patient to contact us with any questions or concerns.   -Counseled on the risks of alcohol use and discussed SAMSA guidelines of no more than 3 drinks per day and no more than 7 drinks per week for women  -Encouraged Patient to keep future appointments.  -Take medications as prescribed and abstain from marijuana or any substance use  -Patient to present to Emergency Department for thoughts to harm herself or others    Return to Clinic: 2 weeks or earlier as needed     WILMER [place]-BC

## 2025-01-17 LAB
ANTI SM ANTIBODY: 0.08 RATIO (ref 0–0.99)
ANTI SM/RNP ANTIBODY: 0.06 RATIO (ref 0–0.99)
ANTI-SM INTERPRETATION: NEGATIVE
ANTI-SM/RNP INTERPRETATION: NEGATIVE
ANTI-SSA ANTIBODY: 0.07 RATIO (ref 0–0.99)
ANTI-SSA INTERPRETATION: NEGATIVE
ANTI-SSB ANTIBODY: 0.06 RATIO (ref 0–0.99)
ANTI-SSB INTERPRETATION: NEGATIVE
DSDNA AB SER-ACNC: NORMAL [IU]/ML

## 2025-01-21 ENCOUNTER — PATIENT MESSAGE (OUTPATIENT)
Dept: PSYCHIATRY | Facility: CLINIC | Age: 20
End: 2025-01-21
Payer: COMMERCIAL

## 2025-01-24 DIAGNOSIS — M25.40 JOINT SWELLING: ICD-10-CM

## 2025-01-24 DIAGNOSIS — M25.50 ARTHRALGIA, UNSPECIFIED JOINT: Primary | ICD-10-CM

## 2025-01-24 DIAGNOSIS — M25.50 GENERALIZED JOINT PAIN: ICD-10-CM

## 2025-01-27 ENCOUNTER — OFFICE VISIT (OUTPATIENT)
Dept: PSYCHIATRY | Facility: CLINIC | Age: 20
End: 2025-01-27
Payer: COMMERCIAL

## 2025-01-27 DIAGNOSIS — F32.81 PMDD (PREMENSTRUAL DYSPHORIC DISORDER): ICD-10-CM

## 2025-01-27 DIAGNOSIS — F43.10 PTSD (POST-TRAUMATIC STRESS DISORDER): ICD-10-CM

## 2025-01-27 DIAGNOSIS — F33.1 MODERATE EPISODE OF RECURRENT MAJOR DEPRESSIVE DISORDER: Primary | ICD-10-CM

## 2025-01-27 DIAGNOSIS — F41.1 GAD (GENERALIZED ANXIETY DISORDER): ICD-10-CM

## 2025-01-27 PROCEDURE — 90837 PSYTX W PT 60 MINUTES: CPT | Mod: 95,,,

## 2025-01-28 NOTE — PROGRESS NOTES
"The patient location is: [place]  The chief complaint leading to consultation is: depression    Visit type: audiovisual    Face to Face time with patient: 60  60 minutes of total time spent on the encounter, which includes face to face time and non-face to face time preparing to see the patient (eg, review of tests), Obtaining and/or reviewing separately obtained history, Documenting clinical information in the electronic or other health record, Independently interpreting results (not separately reported) and communicating results to the patient/family/caregiver, or Care coordination (not separately reported).     Each patient to whom he or she provides medical services by telemedicine is:  (1) informed of the relationship between the physician and patient and the respective role of any other health care provider with respect to management of the patient; and (2) notified that he or she may decline to receive medical services by telemedicine and may withdraw from such care at any time.    Notes:     Individual Psychotherapy (PhD/LCSW)    1/27/2025    Site:  Telemed         Therapeutic Intervention: Met with patient.  Outpatient - Behavior modifying psychotherapy  60 min - CPT code 57423    Chief complaint/reason for encounter: depression, anxiety, and behavior     Interval history and content of current session:     Pt reported that she got her bloodwork done.  It looks like her PCP wants her to follow up with Rheumatology and I encouraged her to make an appointment with them. Talked about how addressing feeling physically unwell, could also help with mental health.   Patient was worrying that her parents wanted her to work full time so she could get benefits.  She worried that she wouldn't be able to "handle it"   We also talked about how is jumping ahead because she doesn't know what is gong on.      We worked on contiuing to work on increasing psychologicla flexibility so she can pursue her values and " "healht.      She reports that her mood has been good. The Snow was a "break in the world" and made her feel close to her family.    Ongoing Treatment Goals:  Increase Psychological Flexibility:  Continue to explore how the patient can experience discomfort without resorting to avoidance or dissociation.  Focus on accepting internal experiences while engaging in values-driven actions.  Clarify and Pursue Values:  Continue working on aligning the patients daily actions with identified values, including protecting others, engaging in creativity, and building meaningful relationships. Discussed how these values can be integrated into her everyday life.  Address Avoidance:  Explore the costs of avoidance strategies and work on replacing them with more adaptive coping mechanisms that encourage engagement and presence.  Enhance Emotional Regulation:  Continue to support the development of skills to build tolerance for intense emotions and reduce impulsive reactions.  Foster Connection:  Focus on creating opportunities for deeper relationships by encouraging small, values-driven risks in vulnerability. Discussed exploring group therapy, nature-based activities, and offline, low-tech engagements to foster meaningful connections.  Plan for Next Session:  Continue working on emotional regulation and avoidance reduction strategies.  Follow up on physical health issues- schedule with rheumatology.     Treatment plan:  Target symptoms: depression, lack of focus, anxiety , adjustment  Why chosen therapy is appropriate versus another modality: relevant to diagnosis, patient responds to this modality, evidence based practice  Outcome monitoring methods: self-report, observation, feedback from family  Therapeutic intervention type: behavior modifying psychotherapy    Risk parameters:  Patient reports no suicidal ideation  Patient reports no homicidal ideation  Patient reports no self-injurious behavior  Patient reports no violent " behavior    Verbal deficits: None    Patient's response to intervention:  The patient's response to intervention is accepting.    Progress toward goals and other mental status changes:  The patient's progress toward goals is fair .    Diagnosis:     ICD-10-CM ICD-9-CM   1. Moderate episode of recurrent major depressive disorder  F33.1 296.32   2. JEANETTE (generalized anxiety disorder)  F41.1 300.02   3. PTSD (post-traumatic stress disorder)  F43.10 309.81   4. PMDD (premenstrual dysphoric disorder)  F32.81 625.4         Plan:  individual psychotherapy    Return to clinic: as scheduled    Length of Service (minutes): 60

## 2025-02-03 ENCOUNTER — OFFICE VISIT (OUTPATIENT)
Dept: PSYCHIATRY | Facility: CLINIC | Age: 20
End: 2025-02-03
Payer: COMMERCIAL

## 2025-02-03 DIAGNOSIS — F33.1 MODERATE EPISODE OF RECURRENT MAJOR DEPRESSIVE DISORDER: Primary | ICD-10-CM

## 2025-02-03 DIAGNOSIS — F43.10 PTSD (POST-TRAUMATIC STRESS DISORDER): ICD-10-CM

## 2025-02-03 DIAGNOSIS — F41.1 GAD (GENERALIZED ANXIETY DISORDER): ICD-10-CM

## 2025-02-03 DIAGNOSIS — F32.81 PMDD (PREMENSTRUAL DYSPHORIC DISORDER): ICD-10-CM

## 2025-02-03 DIAGNOSIS — R45.88 NON-SUICIDAL SELF-HARM: ICD-10-CM

## 2025-02-03 DIAGNOSIS — G47.00 INSOMNIA, UNSPECIFIED TYPE: ICD-10-CM

## 2025-02-03 PROCEDURE — 90837 PSYTX W PT 60 MINUTES: CPT | Mod: 95,,,

## 2025-02-03 NOTE — PROGRESS NOTES
"The patient location is: [place]  The chief complaint leading to consultation is: depression    Visit type: audiovisual    Face to Face time with patient: 60  65 minutes of total time spent on the encounter, which includes face to face time and non-face to face time preparing to see the patient (eg, review of tests), Obtaining and/or reviewing separately obtained history, Documenting clinical information in the electronic or other health record, Independently interpreting results (not separately reported) and communicating results to the patient/family/caregiver, or Care coordination (not separately reported).     Each patient to whom he or she provides medical services by telemedicine is:  (1) informed of the relationship between the physician and patient and the respective role of any other health care provider with respect to management of the patient; and (2) notified that he or she may decline to receive medical services by telemedicine and may withdraw from such care at any time.    Notes:     Individual Psychotherapy (PhD/LCSW)    2/3/2025    Site:  Telemed         Therapeutic Intervention: Met with patient.  Outpatient - Behavior modifying psychotherapy  60 min - CPT code 45775    Chief complaint/reason for encounter: depression, anxiety, and behavior     Interval history and content of current session:   Pt reported for a virtual visit for follow up appointment.     Subjective:  The client reported feeling an "unhealthy attachment" to her boss, seeking her approval similarly to how she seeks approval from her parents. She expressed feelings of inadequacy at work, stating that she feels like she "can't do anything right." The client discussed using opposite action in an attempt to counter these feelings but described experiencing intense self-hatred and questioning her abilities. She mentioned that these emotions often bring up feelings related to her father, with whom she has unresolved emotions. " "She revealed a pattern of self-harming behaviors (reviewed safety plan), including overeating, excessive drinking, and cutting herself, followed by restricting food the following day. The client expressed nervousness about discussing these behaviors but did so openly. She described comparing herself to "healthy" people and feeling like an imposter.  The client shared that she feels alone in the environments she is in, particularly when interacting with others who do not share her mindset. She also spoke about wanting to leave her current job for something potentially more fulfilling but less secure, like massage therapy, though she fears her parents would disapprove. She did, however, schedule an appointment with rheumatology.  Objective:  The client appeared emotionally distressed and vulnerable, discussing a variety of complex feelings with openness.  The clients verbal communication was clear, though marked referencing self-critical language  when thinking to how she felt previously. She was not tearful nor did she seem overly emotional. .  No physical signs of immediate distress were observed, though the client did describe self-harming behaviors (cutting, overeating, and alcohol consumption).  Assessment  The client is struggling with low self-esteem and approval-seeking behaviors, particularly in relation to her parents and boss. These struggles are likely connected to longstanding issues related to her father's emotional influence. The client is currently engaging in self-destructive coping mechanisms (overeating, drinking, cutting, food restriction) in response to intense emotions around perceived failure and inadequacy. Her desire to seek external validation is exacerbated by a negative internal narrative of being an imposter. Despite these challenges, the client has shown progress by taking small steps toward self-care, including attending a parade and scheduling a medical appointment.  There is also a " desire to explore career fulfillment in a vocational path, such as massage therapy, though parental disapproval is a significant barrier. The client may benefit from further exploration of her relationship with authority figures, self-worth, and coping strategies, as well as how to navigate these conflicting desires while fostering greater self-compassion and personal agency.  Plan:  Continue exploring the clients internal conflicts between seeking external approval and developing self-acceptance.  Work on building self-compassion and self-worth through cognitive-behavioral techniques, helping the client challenge negative self-talk and imposter syndrome.  Discuss healthier coping strategies to replace self-harming behaviors, such as mindfulness and distress tolerance techniques.  Explore deeper the clients desire for a vocational change and how to address the fear of parental disapproval while honoring her own interests. She plans to do research and gather information for the time being.   Encourage continued engagement in self-care activities, such as attending social events, to foster positive connections and build a stronger sense of belonging.      Treatment plan:  Target symptoms: depression, lack of focus, anxiety , adjustment  Why chosen therapy is appropriate versus another modality: relevant to diagnosis, patient responds to this modality, evidence based practice  Outcome monitoring methods: self-report, observation, feedback from family  Therapeutic intervention type: behavior modifying psychotherapy    Risk parameters:  Patient reports no suicidal ideation  Patient reports no homicidal ideation  Patient reports no self-injurious behavior  Patient reports no violent behavior    Verbal deficits: None    Patient's response to intervention:  The patient's response to intervention is accepting.    Progress toward goals and other mental status changes:  The patient's progress toward goals is fair  .    Diagnosis:     ICD-10-CM ICD-9-CM   1. Moderate episode of recurrent major depressive disorder  F33.1 296.32   2. JEANETTE (generalized anxiety disorder)  F41.1 300.02   3. PTSD (post-traumatic stress disorder)  F43.10 309.81   4. PMDD (premenstrual dysphoric disorder)  F32.81 625.4   5. Insomnia, unspecified type  G47.00 780.52   6. Non-suicidal self-harm  R45.88 V49.89           Plan:  individual psychotherapy    Return to clinic: as scheduled    Length of Service (minutes): 60

## 2025-02-10 ENCOUNTER — HOSPITAL ENCOUNTER (OUTPATIENT)
Dept: RADIOLOGY | Facility: HOSPITAL | Age: 20
Discharge: HOME | End: 2025-02-10
Attending: STUDENT IN AN ORGANIZED HEALTH CARE EDUCATION/TRAINING PROGRAM
Payer: COMMERCIAL

## 2025-02-10 ENCOUNTER — OFFICE VISIT (OUTPATIENT)
Dept: RHEUMATOLOGY | Facility: CLINIC | Age: 20
End: 2025-02-10
Payer: COMMERCIAL

## 2025-02-10 VITALS
WEIGHT: 174.19 LBS | SYSTOLIC BLOOD PRESSURE: 99 MMHG | BODY MASS INDEX: 29.74 KG/M2 | OXYGEN SATURATION: 99 % | HEART RATE: 53 BPM | RESPIRATION RATE: 18 BRPM | DIASTOLIC BLOOD PRESSURE: 57 MMHG | TEMPERATURE: 98 F | HEIGHT: 64 IN

## 2025-02-10 DIAGNOSIS — M25.40 JOINT SWELLING: ICD-10-CM

## 2025-02-10 DIAGNOSIS — M25.562 CHRONIC PAIN OF BOTH KNEES: ICD-10-CM

## 2025-02-10 DIAGNOSIS — M25.50 ARTHRALGIA, UNSPECIFIED JOINT: ICD-10-CM

## 2025-02-10 DIAGNOSIS — G89.29 CHRONIC PAIN OF BOTH KNEES: ICD-10-CM

## 2025-02-10 DIAGNOSIS — M25.561 CHRONIC PAIN OF BOTH KNEES: ICD-10-CM

## 2025-02-10 DIAGNOSIS — M25.50 GENERALIZED JOINT PAIN: ICD-10-CM

## 2025-02-10 DIAGNOSIS — R76.8 POSITIVE ANA (ANTINUCLEAR ANTIBODY): Primary | ICD-10-CM

## 2025-02-10 PROCEDURE — 73562 X-RAY EXAM OF KNEE 3: CPT | Mod: TC,50,FY

## 2025-02-10 PROCEDURE — 99999 PR PBB SHADOW E&M-EST. PATIENT-LVL V: CPT | Mod: PBBFAC,,, | Performed by: STUDENT IN AN ORGANIZED HEALTH CARE EDUCATION/TRAINING PROGRAM

## 2025-02-10 PROCEDURE — 99205 OFFICE O/P NEW HI 60 MIN: CPT | Mod: S$GLB,,, | Performed by: STUDENT IN AN ORGANIZED HEALTH CARE EDUCATION/TRAINING PROGRAM

## 2025-02-10 PROCEDURE — 73562 X-RAY EXAM OF KNEE 3: CPT | Mod: 26,50,, | Performed by: RADIOLOGY

## 2025-02-10 RX ORDER — CARIPRAZINE 1.5 MG-3MG
KIT ORAL
COMMUNITY

## 2025-02-10 NOTE — PROGRESS NOTES
RHEUMATOLOGY OUTPATIENT CLINIC NOTE    2/10/2025    Attending Rheumatologist: Felicia Crews  Primary Care Provider: [facility]   Physician Requesting Consultation: Yuridia Capps, MECHELLE  [address] [place] [address]  Chief Complaint/Reason For Consultation:  Consult      Subjective:       HPI  Leonila Singh is a 19 y.o. White adult with pmhx noted below referred for positive ROZ.   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       Chronic comorbid conditions affecting medical decision making today:  Past Medical History:   Diagnosis Date    ADHD     Depression     JEANETTE (generalized anxiety disorder)     OCD (obsessive compulsive disorder)     PMDD (premenstrual dysphoric disorder)     PTSD (post-traumatic stress disorder)      Past Surgical History:   Procedure Laterality Date    COLONOSCOPY N/A 11/24/2020    Procedure: COLONOSCOPY;  Surgeon: Estrella Smith MD;  Location: [address]);  Service: Endoscopy;  Laterality: N/A;    ESOPHAGOGASTRODUODENOSCOPY N/A 11/24/2020    Procedure: (EGD);  Surgeon: Estrella Smith MD;  Location: [facility] ([address] [place] [address]);  Service: Endoscopy;  Laterality: N/A;  covid test  11/21    TONSILLECTOMY      WISDOM TOOTH EXTRACTION Bilateral      Family History   Problem Relation Name Age of Onset    Polycystic ovary syndrome Mother      No Known Problems Father      Asthma Sister      No Known Problems Sister      No Known Problems Brother      Cancer Maternal Grandmother          breast    Hypertension Maternal Grandfather      Diabetes Paternal Grandmother      Hypertension Paternal Grandfather      Heart disease Paternal Grandfather      Atrial fibrillation Paternal Grandfather       Social History     Substance and Sexual Activity   Alcohol Use Yes    Comment: couple times per month 2-3 each     Social History     Tobacco Use   Smoking Status Never   Smokeless Tobacco Never     Social History     Substance and Sexual Activity   Drug Use Yes    Frequency: 4.0 times per week    Types: Marijuana       Current Outpatient Medications:     dextroamphetamine-amphetamine (ADDERALL XR) 10 MG 24 hr capsule, Take one tab po qam and one tab po qnoon, Disp: 45 capsule, Rfl: 0    DULoxetine (CYMBALTA) 60 MG capsule, Take 1 capsule (60 mg total) by mouth once daily., Disp: 30 capsule, Rfl: 1    polyethylene glycol (MIRALAX) 17 gram/dose powder, Take 17 g by mouth once daily., Disp: 116 g, Rfl: 0    traZODone (DESYREL) 50 MG tablet, Take one tab and half of trazodone 50 mg po qhs prn, total 75 mg po qhs prn for insomnia, Disp: 45 tablet, Rfl: 2    cariprazine (VRAYLAR) 1.5 mg (1)- 3 mg (6) CpPk, Take by mouth., Disp: , Rfl:     leuprolide (LUPRON) 3.75 mg injection, Inject 3.75 mg into the muscle every 28 days. (Patient not taking: Reported on 2/10/2025), Disp: 1 kit, Rfl: 12     Objective:         Vitals:    02/10/25 1427   BP: (!) 99/57   Pulse: (!) 53   Resp: 18   Temp: 98.4 °F (36.9 °C)     Physical Exam   Constitutional: Leonila is oriented to person, place, and time.   HENT:   Head: Normocephalic and atraumatic.   Right Ear: External ear normal.   Left Ear: External ear normal.   Nose: Nose normal.  "  Mouth/Throat: Oropharynx is clear and moist.   Eyes: Pupils are equal, round, and reactive to light. Conjunctivae are normal.   Cardiovascular: Normal rate and regular rhythm.   Pulmonary/Chest: Effort normal and breath sounds normal.   Abdominal: Soft. Bowel sounds are normal.   Musculoskeletal:         General: Swelling (bilateral knee swelling and tenderness) and tenderness present.      Cervical back: Normal range of motion and neck supple.   Neurological: Leonila is alert and oriented to person, place, and time.   Skin: No rash noted. No erythema.   Psychiatric: Mood and affect normal.       Reviewed old and all outside pertinent medical records available.    All lab results personally reviewed and interpreted by me.  Lab Results   Component Value Date    WBC 5.13 11/04/2024    HGB 12.6 11/04/2024    HCT 39.5 11/04/2024    MCV 86 11/04/2024    MCH 27.6 11/04/2024    MCHC 31.9 (L) 11/04/2024    RDW 13.2 11/04/2024     11/04/2024    MPV 9.1 (L) 11/04/2024       Lab Results   Component Value Date     11/04/2024    K 4.1 11/04/2024     11/04/2024    CO2 20 (L) 11/04/2024     11/04/2024    BUN 13 11/04/2024    CALCIUM 9.1 11/04/2024    PROT 7.2 11/04/2024    ALBUMIN 4.2 11/04/2024    BILITOT 0.3 11/04/2024    AST 15 11/04/2024    ALKPHOS 72 11/04/2024    ALT 14 11/04/2024       Lab Results   Component Value Date    COLORU Colorless (A) 11/04/2024    APPEARANCEUA Clear 11/04/2024    SPECGRAV 1.010 11/04/2024    PHUR 6.0 11/04/2024    PROTEINUA Negative 11/04/2024    KETONESU Negative 11/04/2024    LEUKOCYTESUR 1+ (A) 11/04/2024    NITRITE Negative 11/04/2024    UROBILINOGEN Negative 11/04/2024       Lab Results   Component Value Date    CRP 0.4 01/13/2025       Lab Results   Component Value Date    SEDRATE 5 01/13/2025       Lab Results   Component Value Date    RF <13.0 01/13/2025    SEDRATE 5 01/13/2025       No components found for: "25OHVITDTOT", "81JKQDPJ7", "36NUQTUP6", " ""METHODNOTE"    Lab Results   Component Value Date    URICACID 5.4 01/13/2025       No components found for: "TSPOTTB"    No results found for: "ROZ"     Imaging:  All imaging reviewed and independently interpreted by me.         ASSESSMENT / PLAN:     Leonila Singh is a 19 y.o. White adult with:      1. Positive ROZ (antinuclear antibody) (Primary)  Positive ROZ:  No current evidence of an active connective tissue disease (I.e. SLE or Sjogren).  ROZ can be positive for a number of reasons including: medications, other autoimmune conditions (I.e. thyroid), pulmonary disease, malignancy, chronic infections (I.e. Tuberculosis. HIV, Hepatitis), and in those with relatives who have a CTD.  It can also be positive in up to 30% of healthy individuals.  - will evaluate for autoimmune arthritis     2. Arthralgia, unspecified joint  - Ambulatory referral/consult to Rheumatology    3. Joint swelling  - Ambulatory referral/consult to Rheumatology  - X-Ray Knee 3 View Bilateral; Future  - Anti-Histone Antibody; Future  - C3 Complement; Future  - C4 Complement; Future  - Urinalysis; Future  - TSH; Future  - Protein/Creatinine Ratio, Urine; Future    4. Generalized joint pain  - Ambulatory referral/consult to Rheumatology    5. Chronic pain of both knees  - X-Ray Knee 3 View Bilateral; Future  - Anti-Histone Antibody; Future  - C3 Complement; Future  - C4 Complement; Future  - Urinalysis; Future  - TSH; Future  - Protein/Creatinine Ratio, Urine; Future      Follow up in about 3 months (around 5/10/2025).    Method of contact with patient concerns: [facility]    Disclaimer:  This note is prepared using voice recognition software and as such is likely to have errors and has not been proof read. Please contact me for questions.     Time spent: 60 minutes in face to face discussion concerning diagnosis, prognosis, review of lab and test results, benefits of treatment as well as management of disease, " counseling of patient and coordination of care between various health care providers.  Greater than half the time spent was used for coordination of care and counseling of patient.    Felicia Crews M.D.  Rheumatology Department   [facility]

## 2025-02-17 ENCOUNTER — OFFICE VISIT (OUTPATIENT)
Dept: PSYCHIATRY | Facility: CLINIC | Age: 20
End: 2025-02-17
Payer: COMMERCIAL

## 2025-02-17 ENCOUNTER — PATIENT MESSAGE (OUTPATIENT)
Dept: INTERNAL MEDICINE | Facility: CLINIC | Age: 20
End: 2025-02-17
Payer: COMMERCIAL

## 2025-02-17 DIAGNOSIS — G43.001 MIGRAINE WITHOUT AURA AND WITH STATUS MIGRAINOSUS, NOT INTRACTABLE: Primary | ICD-10-CM

## 2025-02-17 DIAGNOSIS — F43.10 PTSD (POST-TRAUMATIC STRESS DISORDER): ICD-10-CM

## 2025-02-17 DIAGNOSIS — F41.1 GAD (GENERALIZED ANXIETY DISORDER): ICD-10-CM

## 2025-02-17 DIAGNOSIS — F32.81 PMDD (PREMENSTRUAL DYSPHORIC DISORDER): Primary | ICD-10-CM

## 2025-02-17 DIAGNOSIS — R20.0 NUMBNESS: ICD-10-CM

## 2025-02-17 DIAGNOSIS — F33.2 SEVERE EPISODE OF RECURRENT MAJOR DEPRESSIVE DISORDER, WITHOUT PSYCHOTIC FEATURES: ICD-10-CM

## 2025-02-17 DIAGNOSIS — H53.9 VISION CHANGES: ICD-10-CM

## 2025-02-17 NOTE — PROGRESS NOTES
[place]  The chief complaint leading to consultation is: depression    Visit type: audiovisual    Face to Face time with patient: 60  65 minutes of total time spent on the encounter, which includes face to face time and non-face to face time preparing to see the patient (eg, review of tests), Obtaining and/or reviewing separately obtained history, Documenting clinical information in the electronic or other health record, Independently interpreting results (not separately reported) and communicating results to the patient/family/caregiver, or Care coordination (not separately reported).     Each patient to whom he or she provides medical services by telemedicine is:  (1) informed of the relationship between the physician and patient and the respective role of any other health care provider with respect to management of the patient; and (2) notified that he or she may decline to receive medical services by telemedicine and may withdraw from such care at any time.    Notes:     Individual Psychotherapy (PhD/LCSW)    2/17/2025    Site:  Telemed         Therapeutic Intervention: Met with patient.  Outpatient - Behavior modifying psychotherapy  60 min - CPT code 27709    Chief complaint/reason for encounter: depression, anxiety, and behavior     Interval history and content of current session:   Pt reported for a virtual visit for follow up appointment.     Presenting Concerns:  PT reported experiencing a new episode where she felt like her body was on fire and had significant ear leakage (enough to wet her shirt). She also experienced temporary vision loss (blackout) for approximately one minute and felt extreme dizziness. She plans to follow up with Yuridia Capps NP, to discuss this episode along with her lab results from rheumatology to develop an appropriate plan.  Psychosocial Updates:  PT recently attended a meeting with a group associated with [facility] and found it  "to be a positive experience.  She expressed hesitation in accepting help from her parents despite their willingness to provide support. Parents have expressed a preference for her to work full-time, but PT is uncertain about committing to full-time work due to ongoing struggles with her physical health.  PT has made efforts to reconcile with her brother, who is currently in boot camp, reflecting on her complex family relationships. She stated, "I love my family despite everything," but also emphasized the need for space and independence.  Therapeutic Interventions & Discussion Topics:  Explored different types of living situations, including options such as having roommates or renting a room, to support her goal of increased independence.  Discussed ways to continue building a supportive social network to enhance her emotional and practical support system.  Plan & Next Steps:  PT will follow up with her medical provider regarding recent physical symptoms and rheumatology labs.  Continue exploring living arrangement options that align with her needs and goals.  Work on strengthening her social support network through positive connections and structured activities.  Ongoing therapy to navigate family dynamics and career decisions while prioritizing her health.  Clinical Impressions:  PT is actively engaged in therapy and demonstrating insight into her needs for both independence and support. She continues to navigate complex familial expectations while prioritizing her health and well-being. Monitoring physical symptoms and exploring sustainable life choices remain primary focuses of treatment.    Treatment plan:  Target symptoms: depression, lack of focus, anxiety , adjustment  Why chosen therapy is appropriate versus another modality: relevant to diagnosis, patient responds to this modality, evidence based practice  Outcome monitoring methods: self-report, observation, feedback from family  Therapeutic intervention " type: behavior modifying psychotherapy    Risk parameters:  Patient reports no suicidal ideation  Patient reports no homicidal ideation  Patient reports no self-injurious behavior  Patient reports no violent behavior    Verbal deficits: None    Patient's response to intervention:  The patient's response to intervention is accepting.    Progress toward goals and other mental status changes:  The patient's progress toward goals is fair .    Diagnosis:     ICD-10-CM ICD-9-CM   1. PMDD (premenstrual dysphoric disorder)  F32.81 625.4   2. PTSD (post-traumatic stress disorder)  F43.10 309.81   3. Severe episode of recurrent major depressive disorder, without psychotic features  F33.2 296.33   4. JEANETTE (generalized anxiety disorder)  F41.1 300.02     Plan:  individual psychotherapy    Return to clinic: as scheduled    Length of Service (minutes): 60

## 2025-02-24 ENCOUNTER — PATIENT MESSAGE (OUTPATIENT)
Dept: PSYCHIATRY | Facility: CLINIC | Age: 20
End: 2025-02-24
Payer: COMMERCIAL

## 2025-02-24 ENCOUNTER — OFFICE VISIT (OUTPATIENT)
Dept: PSYCHIATRY | Facility: CLINIC | Age: 20
End: 2025-02-24
Payer: COMMERCIAL

## 2025-02-24 ENCOUNTER — HOSPITAL ENCOUNTER (OUTPATIENT)
Dept: RADIOLOGY | Facility: HOSPITAL | Age: 20
Discharge: HOME | End: 2025-02-24
Attending: NURSE PRACTITIONER
Payer: COMMERCIAL

## 2025-02-24 ENCOUNTER — RESULTS FOLLOW-UP (OUTPATIENT)
Dept: INTERNAL MEDICINE | Facility: CLINIC | Age: 20
End: 2025-02-24

## 2025-02-24 DIAGNOSIS — G43.001 MIGRAINE WITHOUT AURA AND WITH STATUS MIGRAINOSUS, NOT INTRACTABLE: ICD-10-CM

## 2025-02-24 DIAGNOSIS — F33.2 SEVERE EPISODE OF RECURRENT MAJOR DEPRESSIVE DISORDER, WITHOUT PSYCHOTIC FEATURES: ICD-10-CM

## 2025-02-24 DIAGNOSIS — H53.9 VISION CHANGES: ICD-10-CM

## 2025-02-24 DIAGNOSIS — F43.10 PTSD (POST-TRAUMATIC STRESS DISORDER): ICD-10-CM

## 2025-02-24 DIAGNOSIS — F33.1 MODERATE EPISODE OF RECURRENT MAJOR DEPRESSIVE DISORDER: ICD-10-CM

## 2025-02-24 DIAGNOSIS — F41.1 GAD (GENERALIZED ANXIETY DISORDER): ICD-10-CM

## 2025-02-24 DIAGNOSIS — R20.0 NUMBNESS: ICD-10-CM

## 2025-02-24 DIAGNOSIS — F32.81 PMDD (PREMENSTRUAL DYSPHORIC DISORDER): Primary | ICD-10-CM

## 2025-02-24 PROCEDURE — 70551 MRI BRAIN STEM W/O DYE: CPT | Mod: TC

## 2025-02-24 PROCEDURE — 70551 MRI BRAIN STEM W/O DYE: CPT | Mod: 26,,, | Performed by: RADIOLOGY

## 2025-02-24 PROCEDURE — 90837 PSYTX W PT 60 MINUTES: CPT | Mod: 95,,,
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
## 2025-03-08 ENCOUNTER — PATIENT MESSAGE (OUTPATIENT)
Dept: OBSTETRICS AND GYNECOLOGY | Facility: CLINIC | Age: 20
End: 2025-03-08
Payer: COMMERCIAL

## 2025-03-10 ENCOUNTER — PATIENT MESSAGE (OUTPATIENT)
Dept: PSYCHIATRY | Facility: CLINIC | Age: 20
End: 2025-03-10
Payer: COMMERCIAL

## 2025-03-10 ENCOUNTER — OFFICE VISIT (OUTPATIENT)
Dept: PSYCHIATRY | Facility: CLINIC | Age: 20
End: 2025-03-10
Payer: COMMERCIAL

## 2025-03-10 DIAGNOSIS — F33.2 SEVERE EPISODE OF RECURRENT MAJOR DEPRESSIVE DISORDER, WITHOUT PSYCHOTIC FEATURES: ICD-10-CM

## 2025-03-10 DIAGNOSIS — F41.1 GAD (GENERALIZED ANXIETY DISORDER): ICD-10-CM

## 2025-03-10 DIAGNOSIS — F32.81 PMDD (PREMENSTRUAL DYSPHORIC DISORDER): Primary | ICD-10-CM

## 2025-03-10 DIAGNOSIS — F43.10 PTSD (POST-TRAUMATIC STRESS DISORDER): ICD-10-CM

## 2025-03-10 DIAGNOSIS — F33.1 MODERATE EPISODE OF RECURRENT MAJOR DEPRESSIVE DISORDER: ICD-10-CM
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
## 2025-03-17 ENCOUNTER — OFFICE VISIT (OUTPATIENT)
Dept: PSYCHIATRY | Facility: CLINIC | Age: 20
End: 2025-03-17
Payer: COMMERCIAL

## 2025-03-17 DIAGNOSIS — G47.00 INSOMNIA, UNSPECIFIED TYPE: ICD-10-CM

## 2025-03-17 DIAGNOSIS — F41.9 ANXIETY DISORDER, UNSPECIFIED TYPE: ICD-10-CM

## 2025-03-17 DIAGNOSIS — F32.81 PMDD (PREMENSTRUAL DYSPHORIC DISORDER): Primary | ICD-10-CM

## 2025-03-17 DIAGNOSIS — F41.1 GAD (GENERALIZED ANXIETY DISORDER): ICD-10-CM

## 2025-03-17 DIAGNOSIS — F43.10 PTSD (POST-TRAUMATIC STRESS DISORDER): ICD-10-CM

## 2025-03-17 DIAGNOSIS — F33.1 MODERATE EPISODE OF RECURRENT MAJOR DEPRESSIVE DISORDER: ICD-10-CM

## 2025-03-17 DIAGNOSIS — F33.2 SEVERE EPISODE OF RECURRENT MAJOR DEPRESSIVE DISORDER, WITHOUT PSYCHOTIC FEATURES: ICD-10-CM

## 2025-03-17 DIAGNOSIS — F33.41 RECURRENT MAJOR DEPRESSIVE DISORDER, IN PARTIAL REMISSION: Primary | ICD-10-CM

## 2025-03-17 PROCEDURE — 98006 SYNCH AUDIO-VIDEO EST MOD 30: CPT | Mod: 95,,, | Performed by: NURSE PRACTITIONER

## 2025-03-17 PROCEDURE — G2211 COMPLEX E/M VISIT ADD ON: HCPCS | Mod: 95,,, | Performed by: NURSE PRACTITIONER

## 2025-03-17 RX ORDER — HYDROXYZINE HYDROCHLORIDE 10 MG/1
TABLET, FILM COATED ORAL
Qty: 30 TABLET | Refills: 2 | Status: SHIPPED | OUTPATIENT
Start: 2025-03-17

## 2025-03-17 RX ORDER — DULOXETIN HYDROCHLORIDE 60 MG/1
60 CAPSULE, DELAYED RELEASE ORAL DAILY
Qty: 30 CAPSULE | Refills: 3 | Status: SHIPPED | OUTPATIENT
Start: 2025-03-17

## 2025-03-17 RX ORDER — TRAZODONE HYDROCHLORIDE 50 MG/1
TABLET ORAL
Qty: 45 TABLET | Refills: 2 | Status: SHIPPED | OUTPATIENT
Start: 2025-03-17

## 2025-03-17 RX ORDER — CARIPRAZINE 1.5 MG/1
1.5 CAPSULE, GELATIN COATED ORAL DAILY
Qty: 30 CAPSULE | Refills: 2 | Status: SHIPPED | OUTPATIENT
Start: 2025-03-17 | End: 2025-04-16

## 2025-03-17 NOTE — PROGRESS NOTES
"The patient location is: [place], [place]  The chief complaint leading to consultation is: depression    Visit type: audiovisual    Face to Face time with patient: 60  65 minutes of total time spent on the encounter, which includes face to face time and non-face to face time preparing to see the patient (eg, review of tests), Obtaining and/or reviewing separately obtained history, Documenting clinical information in the electronic or other health record, Independently interpreting results (not separately reported) and communicating results to the patient/family/caregiver, or Care coordination (not separately reported).     Each patient to whom he or she provides medical services by telemedicine is:  (1) informed of the relationship between the physician and patient and the respective role of any other health care provider with respect to management of the patient; and (2) notified that he or she may decline to receive medical services by telemedicine and may withdraw from such care at any time.    Notes:     Individual Psychotherapy (PhD/LCSW)    3/17/2025    Site:  Telemed         Therapeutic Intervention: Met with patient.  Outpatient - Behavior modifying psychotherapy  60 min - CPT code 68318    Chief complaint/reason for encounter: depression, anxiety, and behavior     Subjective Patient presented for a virtual follow-up appointment. She reported having an "okay weekend" and attended a parade, which she enjoyed. However, she expressed feeling  from "people around her." She noted that seeing close-knit families at the parade made her feel worse about her own family relationships, stating that she feels like "she can't share" with them.  She has not yet scheduled her OB/GYN appointment, which is crucial for managing her PMDD. She does have an appointment scheduled later today with her prescriber, Mely Lama. At the end f the session I notices she had some large bandages.  She admitted they were frm " self harming as recently as Saturday.  We talked about replacement behaviors like using cold shower or enaging mamalian divers reflex.   Objective  Patient engaged and able to reflect on emotions but reports ongoing feelings of isolation.  Pt mood was quiet, but reflective.   Continues to struggle with familial relationships and social connectedness.  Assessment  Persistent feelings of isolation and detachment.  Recent self harming.(Superficial)  Barriers to addressing PMDD due to delay in OB/GYN appointment scheduling.  Progress made in applying for full-time employment, which could improve financial independence and well-being.  Plan  Encourage scheduling OB/GYN appointment to address PMDD symptoms.  Discuss strategies for improving social support and communication with family.  Support patient in evaluating the benefits of full-time employment and independent living.  Utilize mammalian divers reflect reflex and other TIP skills from DBT as opposed to self harming.   Follow up on outcomes of prescriber appointment.  Continue monitoring mood and social engagement at next session.    Treatment plan:  Target symptoms: depression, lack of focus, anxiety , adjustment  Why chosen therapy is appropriate versus another modality: relevant to diagnosis, patient responds to this modality, evidence based practice  Outcome monitoring methods: self-report, observation, feedback from family  Therapeutic intervention type: behavior modifying psychotherapy    Risk parameters:  Patient reports no suicidal ideation  Patient reports no homicidal ideation  Patient reports no self-injurious behavior  Patient reports no violent behavior    Verbal deficits: None    Patient's response to intervention:  The patient's response to intervention is accepting.    Progress toward goals and other mental status changes:  The patient's progress toward goals is fair .    Diagnosis:   No diagnosis found.        Plan:  individual  psychotherapy    Return to clinic: as scheduled    Length of Service (minutes): 60
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
## 2025-03-31 ENCOUNTER — OFFICE VISIT (OUTPATIENT)
Dept: PSYCHIATRY | Facility: CLINIC | Age: 20
End: 2025-03-31
Payer: COMMERCIAL

## 2025-03-31 DIAGNOSIS — F33.2 SEVERE EPISODE OF RECURRENT MAJOR DEPRESSIVE DISORDER, WITHOUT PSYCHOTIC FEATURES: ICD-10-CM

## 2025-03-31 DIAGNOSIS — F33.1 MODERATE EPISODE OF RECURRENT MAJOR DEPRESSIVE DISORDER: ICD-10-CM

## 2025-03-31 DIAGNOSIS — G47.00 INSOMNIA, UNSPECIFIED TYPE: ICD-10-CM

## 2025-03-31 DIAGNOSIS — F32.81 PMDD (PREMENSTRUAL DYSPHORIC DISORDER): Primary | ICD-10-CM

## 2025-03-31 DIAGNOSIS — F41.1 GAD (GENERALIZED ANXIETY DISORDER): ICD-10-CM

## 2025-03-31 DIAGNOSIS — F43.10 PTSD (POST-TRAUMATIC STRESS DISORDER): ICD-10-CM

## 2025-03-31 PROCEDURE — 90837 PSYTX W PT 60 MINUTES: CPT | Mod: 95,,,
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
## 2025-05-05 ENCOUNTER — OFFICE VISIT (OUTPATIENT)
Dept: PSYCHIATRY | Facility: CLINIC | Age: 20
End: 2025-05-05
Payer: COMMERCIAL

## 2025-05-05 DIAGNOSIS — F43.10 PTSD (POST-TRAUMATIC STRESS DISORDER): ICD-10-CM

## 2025-05-05 DIAGNOSIS — F33.1 MODERATE EPISODE OF RECURRENT MAJOR DEPRESSIVE DISORDER: ICD-10-CM

## 2025-05-05 DIAGNOSIS — F32.81 PMDD (PREMENSTRUAL DYSPHORIC DISORDER): Primary | ICD-10-CM

## 2025-05-05 DIAGNOSIS — F41.1 GAD (GENERALIZED ANXIETY DISORDER): ICD-10-CM

## 2025-05-05 DIAGNOSIS — F33.2 SEVERE EPISODE OF RECURRENT MAJOR DEPRESSIVE DISORDER, WITHOUT PSYCHOTIC FEATURES: ICD-10-CM

## 2025-05-05 NOTE — PROGRESS NOTES
The patient location is: [place], [place]  The chief complaint leading to consultation is: depression    Visit type: audiovisual    Face to Face time with patient: 60  65 minutes of total time spent on the encounter, which includes face to face time and non-face to face time preparing to see the patient (eg, review of tests), Obtaining and/or reviewing separately obtained history, Documenting clinical information in the electronic or other health record, Independently interpreting results (not separately reported) and communicating results to the patient/family/caregiver, or Care coordination (not separately reported).     Each patient to whom he or she provides medical services by telemedicine is:  (1) informed of the relationship between the physician and patient and the respective role of any other health care provider with respect to management of the patient; and (2) notified that he or she may decline to receive medical services by telemedicine and may withdraw from such care at any time.    Notes:     Individual Psychotherapy (PhD/LCSW)    5/5/2025    Site:  Telemed         Therapeutic Intervention: Met with patient.  Outpatient - Behavior modifying psychotherapy  60 min - CPT code 91701    Chief complaint/reason for encounter: depression, anxiety,  Subjective:  Patient presented for a follow-up visit and reported she is not doing that well. She shared that she recently interviewed for a full-time job but expressed significant ambivalence about the opportunity. She remains uncertain about engaging in treatment and voiced concerns about her ongoing symptoms and lack of progress.  Objective:  Patient appeared discouraged and conflicted during session. Affect was flat; thought process was coherent but marked by hopeless cognitions.  Assessment:  We explored underlying factors contributing to her emotional stagnation and discussed the possibility of a higher level of care. Due to transportation limitations,  the patient is unable to attend [facility]s IOP. I encouraged her to consider [facility] IOP as an alternative, though she remained ambivalent.  Contacted Mely Lama (prescriber) to provide an update; she concurred that an IOP referral is appropriate given the patients continued symptoms. Patient acknowledges that she knows therapeutic skills but struggles to access them during periods of acute distress.  Plan:  We practiced cognitive defusion techniques, focusing on shifting internal language from Ill never get better to more distancing phrases such as I am noticing the thought... or imagining the thought written on paper, separate from herself. Will continue to monitor symptoms and reassess engagement with IOP next session.      Treatment plan:  Target symptoms: depression, lack of focus, anxiety , adjustment  Why chosen therapy is appropriate versus another modality: relevant to diagnosis, patient responds to this modality, evidence based practice  Outcome monitoring methods: self-report, observation, feedback from family  Therapeutic intervention type: behavior modifying psychotherapy    Risk parameters:  Patient reports no suicidal ideation  Patient reports no homicidal ideation  Patient reports no self-injurious behavior  Patient reports no violent behavior    Verbal deficits: None    Patient's response to intervention:  The patient's response to intervention is accepting.    Progress toward goals and other mental status changes:  The patient's progress toward goals is fair .    Diagnosis:     ICD-10-CM ICD-9-CM   1. PMDD (premenstrual dysphoric disorder)  F32.81 625.4   2. PTSD (post-traumatic stress disorder)  F43.10 309.81   3. Severe episode of recurrent major depressive disorder, without psychotic features  F33.2 296.33   4. JEANETTE (generalized anxiety disorder)  F41.1 300.02   5. Moderate episode of recurrent major depressive disorder  F33.1 296.32         Plan:  individual psychotherapy    Return to  clinic: as scheduled    Length of Service (minutes): 60

## 2025-05-22 ENCOUNTER — OCCUPATIONAL HEALTH (OUTPATIENT)
Dept: URGENT CARE | Facility: CLINIC | Age: 20
End: 2025-05-22
Payer: MEDICAID

## 2025-05-22 DIAGNOSIS — Z02.1 PRE-EMPLOYMENT EXAMINATION: Primary | ICD-10-CM

## 2025-06-09 ENCOUNTER — OFFICE VISIT (OUTPATIENT)
Dept: PSYCHIATRY | Facility: CLINIC | Age: 20
End: 2025-06-09
Payer: COMMERCIAL

## 2025-06-09 DIAGNOSIS — F90.0 ATTENTION DEFICIT HYPERACTIVITY DISORDER (ADHD), PREDOMINANTLY INATTENTIVE TYPE: Primary | ICD-10-CM

## 2025-06-09 DIAGNOSIS — F43.10 PTSD (POST-TRAUMATIC STRESS DISORDER): ICD-10-CM

## 2025-06-09 DIAGNOSIS — F41.1 GAD (GENERALIZED ANXIETY DISORDER): ICD-10-CM

## 2025-06-09 DIAGNOSIS — F32.81 PMDD (PREMENSTRUAL DYSPHORIC DISORDER): ICD-10-CM

## 2025-06-09 DIAGNOSIS — F33.2 SEVERE EPISODE OF RECURRENT MAJOR DEPRESSIVE DISORDER, WITHOUT PSYCHOTIC FEATURES: ICD-10-CM

## 2025-06-09 PROCEDURE — 90834 PSYTX W PT 45 MINUTES: CPT | Mod: 95,,,

## 2025-06-10 NOTE — PROGRESS NOTES
The patient location is: [place], [place]  The chief complaint leading to consultation is: depression    Visit type: audiovisual    Face to Face time with patient: 60  65 minutes of total time spent on the encounter, which includes face to face time and non-face to face time preparing to see the patient (eg, review of tests), Obtaining and/or reviewing separately obtained history, Documenting clinical information in the electronic or other health record, Independently interpreting results (not separately reported) and communicating results to the patient/family/caregiver, or Care coordination (not separately reported).     Each patient to whom he or she provides medical services by telemedicine is:  (1) informed of the relationship between the physician and patient and the respective role of any other health care provider with respect to management of the patient; and (2) notified that he or she may decline to receive medical services by telemedicine and may withdraw from such care at any time.    Notes:     Individual Psychotherapy (PhD/LCSW)    6/9/2025    Site:  Telemed         Therapeutic Intervention: Met with patient.  Outpatient - Behavior modifying psychotherapy   45min - CPT code 11914    Chief complaint/reason for encounter: depression, anxiety,    Subjective  Patient presented for a virtual follow-up visit, which began approximately 15 minutes late due to technical difficulties connecting to the call. She reported that, overall, things are going really well. She recently began a new romantic relationship and expressed enthusiasm and excitement about her new boyfriend.  She has started full-time employment with [employer]. While she recognizes this as a positive step, she reported mixed feelings about the transition. A housing opportunity she had been counting on fell through, and she expressed distress, including catastrophizing thoughts about being unable to move out or gain independence from  her parents.  Patient was encouraged to follow up with her primary care provider (PCP) if she continues to feel that any medical issues are not being adequately addressed.  Objective  Patient was approximately 15 minutes late to the virtual session due to connection issues.  Appears emotionally expressive and engaged in session.  Reports increased stress related to housing insecurity and dependence on family.  No observed safety concerns at this time.  Assessment  Patient is showing emotional investment in new personal and professional developments, including a new romantic relationship and a full-time job. While she is reporting overall improvement in mood, underlying stressors--particularly related to housing instability and autonomy--remain active. Cognitive distortions such as catastrophizing continue to impact her outlook. Clinical focus may benefit from addressing anxiety and stress management, especially around transitional life phases.   Plan  Resume therapy on a biweekly basis to accommodate work schedule.  Explore cognitive strategies in upcoming sessions to challenge catastrophic thinking and support emotional regulation.  Encourage continued monitoring of physical health concerns and follow up with PCP as needed.  Assess and support future planning regarding independent living and boundaries with parents.  Continue to monitor mood, stress level, and relationship dynamics in upcoming sessions.    Treatment plan:  Target symptoms: depression, lack of focus, anxiety , adjustment  Why chosen therapy is appropriate versus another modality: relevant to diagnosis, patient responds to this modality, evidence based practice  Outcome monitoring methods: self-report, observation, feedback from family  Therapeutic intervention type: behavior modifying psychotherapy    Risk parameters:  Patient reports no suicidal ideation  Patient reports no homicidal ideation  Patient reports no self-injurious behavior  Patient  reports no violent behavior    Verbal deficits: None    Patient's response to intervention:  The patient's response to intervention is accepting.    Progress toward goals and other mental status changes:  The patient's progress toward goals is fair .    Diagnosis:     ICD-10-CM ICD-9-CM   1. Attention deficit hyperactivity disorder (ADHD), predominantly inattentive type  F90.0 314.00   2. PMDD (premenstrual dysphoric disorder)  F32.81 625.4   3. PTSD (post-traumatic stress disorder)  F43.10 309.81   4. Severe episode of recurrent major depressive disorder, without psychotic features  F33.2 296.33   5. JEANETTE (generalized anxiety disorder)  F41.1 300.02       Plan:  individual psychotherapy    Return to clinic: as scheduled    Length of Service (minutes): 45

## 2025-06-20 ENCOUNTER — OFFICE VISIT (OUTPATIENT)
Dept: PSYCHIATRY | Facility: CLINIC | Age: 20
End: 2025-06-20
Payer: COMMERCIAL

## 2025-06-20 DIAGNOSIS — F32.81 PMDD (PREMENSTRUAL DYSPHORIC DISORDER): Primary | ICD-10-CM

## 2025-06-20 DIAGNOSIS — F43.10 PTSD (POST-TRAUMATIC STRESS DISORDER): ICD-10-CM

## 2025-06-20 DIAGNOSIS — F33.2 SEVERE EPISODE OF RECURRENT MAJOR DEPRESSIVE DISORDER, WITHOUT PSYCHOTIC FEATURES: ICD-10-CM

## 2025-06-20 DIAGNOSIS — F90.0 ATTENTION DEFICIT HYPERACTIVITY DISORDER (ADHD), PREDOMINANTLY INATTENTIVE TYPE: ICD-10-CM

## 2025-06-20 DIAGNOSIS — F41.1 GAD (GENERALIZED ANXIETY DISORDER): ICD-10-CM

## 2025-06-20 NOTE — PROGRESS NOTES
The patient location is: [place], [place]  The chief complaint leading to consultation is: depression    Visit type: audiovisual    Face to Face time with patient: 60  65 minutes of total time spent on the encounter, which includes face to face time and non-face to face time preparing to see the patient (eg, review of tests), Obtaining and/or reviewing separately obtained history, Documenting clinical information in the electronic or other health record, Independently interpreting results (not separately reported) and communicating results to the patient/family/caregiver, or Care coordination (not separately reported).     Each patient to whom he or she provides medical services by telemedicine is:  (1) informed of the relationship between the physician and patient and the respective role of any other health care provider with respect to management of the patient; and (2) notified that he or she may decline to receive medical services by telemedicine and may withdraw from such care at any time.    Notes:     Individual Psychotherapy (PhD/LCSW)    6/20/2025    Site:  Telemed         Therapeutic Intervention: Met with patient.  Outpatient - Behavior modifying psychotherapy   45min - CPT code 17488    Chief complaint/reason for encounter: depression, anxiety,    Subjective    Patient presented for a virtual follow-up session. She initiated the visit wanting to talk about a recent incident in which she engaged in self-harm. She expressed disappointment in herself for the setback and shared that she has restarted her coping tracker in response, in an effort to regain a sense of structure and accountability.  She also discussed her current romantic relationship, noting that it is still new and brings both positive feelings and emotional stress. She expressed frustration about not being better by now, referring to her progress in therapy and recovery. She questioned whether she is making meaningful progress and  "expressed doubts about whether she will ever feel "normal" or stable.  Objective:  Patient appeared appropriately dressed and oriented to person, place, and time. Affect was subdued but congruent with the subject matter. Thought process was linear and goal-directed. Patient was able to reflect insightfully on patterns of thought and behavior during the session. No acute suicidal ideation was reported, though recent self-harm indicates a need for ongoing risk monitoring. Patient denied intent to harm self again and affirmed she would use her coping plan if urges returned.  Assessment:  Patient continues to present with symptoms consistent with mood dysregulation and cognitive distortions, particularly related to self-worth and expectations about recovery. Her frustration with not feeling better suggests perfectionistic tendencies and all-or-nothing thinking. Completion of the Blocking Beliefs Questionnaire revealed several entrenched beliefs that are interfering with progress in therapy, including beliefs such as I dont deserve to feel better and If I let go of my trauma, I wont be myself anymore. These beliefs may be acting as internal barriers to change and contributing to her emotional stuckness. Patient remains engaged in treatment and demonstrates motivation to continue exploring these patterns.  Plan:  Continue to assess risk for self-harm at each visit; reinforce use of coping strategies and safety plan.  Explore specific blocking beliefs identified in questionnaire in future sessions, using a combination of CBT and ACT  approaches.  Normalize and validate non-linear recovery process while introducing more flexible self-talk.  Encourage use of the coping tracker as a tool for awareness and accountability.  Discuss relationship stressors and develop strategies for emotional regulation within the context of interpersonal relationships.      Treatment plan:  Target symptoms: depression, lack of focus, " anxiety , adjustment  Why chosen therapy is appropriate versus another modality: relevant to diagnosis, patient responds to this modality, evidence based practice  Outcome monitoring methods: self-report, observation, feedback from family  Therapeutic intervention type: behavior modifying psychotherapy    Risk parameters:  Patient reports no suicidal ideation  Patient reports no homicidal ideation  Patient reports no self-injurious behavior  Patient reports no violent behavior    Verbal deficits: None    Patient's response to intervention:  The patient's response to intervention is accepting.    Progress toward goals and other mental status changes:  The patient's progress toward goals is fair .    Diagnosis:     ICD-10-CM ICD-9-CM   1. PMDD (premenstrual dysphoric disorder)  F32.81 625.4   2. Attention deficit hyperactivity disorder (ADHD), predominantly inattentive type  F90.0 314.00   3. PTSD (post-traumatic stress disorder)  F43.10 309.81   4. Severe episode of recurrent major depressive disorder, without psychotic features  F33.2 296.33   5. JEANETTE (generalized anxiety disorder)  F41.1 300.02         Plan:  individual psychotherapy    Return to clinic: as scheduled    Length of Service (minutes): 45

## 2025-06-30 ENCOUNTER — OFFICE VISIT (OUTPATIENT)
Dept: PSYCHIATRY | Facility: CLINIC | Age: 20
End: 2025-06-30
Payer: COMMERCIAL

## 2025-06-30 DIAGNOSIS — F33.2 SEVERE EPISODE OF RECURRENT MAJOR DEPRESSIVE DISORDER, WITHOUT PSYCHOTIC FEATURES: ICD-10-CM

## 2025-06-30 DIAGNOSIS — G47.00 INSOMNIA, UNSPECIFIED TYPE: ICD-10-CM

## 2025-06-30 DIAGNOSIS — F90.0 ATTENTION DEFICIT HYPERACTIVITY DISORDER (ADHD), PREDOMINANTLY INATTENTIVE TYPE: ICD-10-CM

## 2025-06-30 DIAGNOSIS — F32.81 PMDD (PREMENSTRUAL DYSPHORIC DISORDER): Primary | ICD-10-CM

## 2025-06-30 DIAGNOSIS — F41.1 GAD (GENERALIZED ANXIETY DISORDER): ICD-10-CM

## 2025-06-30 DIAGNOSIS — F43.10 PTSD (POST-TRAUMATIC STRESS DISORDER): ICD-10-CM
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
## 2025-07-14 ENCOUNTER — OFFICE VISIT (OUTPATIENT)
Dept: PSYCHIATRY | Facility: CLINIC | Age: 20
End: 2025-07-14
Payer: COMMERCIAL

## 2025-07-14 DIAGNOSIS — F41.1 GAD (GENERALIZED ANXIETY DISORDER): ICD-10-CM

## 2025-07-14 DIAGNOSIS — F43.10 PTSD (POST-TRAUMATIC STRESS DISORDER): ICD-10-CM

## 2025-07-14 DIAGNOSIS — F33.2 SEVERE EPISODE OF RECURRENT MAJOR DEPRESSIVE DISORDER, WITHOUT PSYCHOTIC FEATURES: Primary | ICD-10-CM

## 2025-07-14 DIAGNOSIS — F90.0 ATTENTION DEFICIT HYPERACTIVITY DISORDER (ADHD), PREDOMINANTLY INATTENTIVE TYPE: ICD-10-CM

## 2025-07-14 DIAGNOSIS — F32.81 PMDD (PREMENSTRUAL DYSPHORIC DISORDER): ICD-10-CM

## 2025-07-14 NOTE — PROGRESS NOTES
The patient location is: [place], [place]  The chief complaint leading to consultation is: depression    Visit type: audiovisual    Face to Face time with patient: 57  60 minutes of total time spent on the encounter, which includes face to face time and non-face to face time preparing to see the patient (eg, review of tests), Obtaining and/or reviewing separately obtained history, Documenting clinical information in the electronic or other health record, Independently interpreting results (not separately reported) and communicating results to the patient/family/caregiver, or Care coordination (not separately reported).     Each patient to whom he or she provides medical services by telemedicine is:  (1) informed of the relationship between the physician and patient and the respective role of any other health care provider with respect to management of the patient; and (2) notified that he or she may decline to receive medical services by telemedicine and may withdraw from such care at any time.      Individual Psychotherapy (PhD/LCSW)    7/14/2025    Site:  Telemed         Therapeutic Intervention: Met with patient.  Outpatient - Behavior modifying psychotherapy   60min - CPT code 61020    Chief complaint/reason for encounter: depression, anxiety,physical pain    Subjective  Patient reports an increase in suicidal thoughts and expresses that she feels her current medication is not working. She attributes many of her struggles to her trauma and voiced hopelessness about ever finding something that works. Patient stated, Im losing hope that Ill ever feel better. She shared ongoing concerns about her partner not understanding her experience, noting, He doesnt get it because he hasnt been through trauma. Despite acknowledging the need for follow-up care, she has not scheduled with her OBGYN regarding possible PMDD and has a history of missing or not scheduling appointments, then reporting ongoing distress  or physical pain.  O: Objective  Patient was alert and oriented x3. Affect was flat; mood appeared low. No overt psychosis or rafa observed. Patient did make a follow-up with her primary care provider but has not yet followed through on OBGYN referral. Engagement was limited at times but improved with supportive confrontation. No immediate safety concerns noted; no plan or intent disclosed, though passive SI persists.  Assessment  Worsening depressive symptoms with increased passive suicidal ideation.  Limited insight into personal responsibility for follow-through on care.  Ongoing difficulty managing trauma-related distress and interpersonal challenges.  Appears ambivalent about treatment and disillusioned with therapeutic and pharmacological interventions.  Differential: PMDD contributing to mood instability.  Strengths: Able to follow through with PCP appointment, engaged in session with minimal prompting.  Plan  Encouraged patient to follow up with prescriber (Mely) and be honest about symptoms and treatment concerns.  Provided psychoeducation on new medication options and the importance of clear communication with providers.  Will follow up with Mely to coordinate care and communicate concerns about symptom worsening.  Challenged cognitive distortions related to trauma and interpersonal assumptions.  Consulted with clinical supervisor Jazmyn Hodges LCSW, who provided additional insight and support on next steps.  Will continue to monitor safety and follow up on referrals at next session.  This session involved Interactive Complexity (01187); that is, specific communication factors complicated the delivery of the session Specifically, evaluation participant emotions and behavior interfered with understanding and ability to assist with treatment.      Treatment plan:  Target symptoms: depression, lack of focus, anxiety , adjustment  Why chosen therapy is appropriate versus another modality: relevant to  diagnosis, patient responds to this modality, evidence based practice  Outcome monitoring methods: self-report, observation, feedback from family  Therapeutic intervention type: behavior modifying psychotherapy    Risk parameters:  Patient reports no suicidal ideation  Patient reports no homicidal ideation  Patient reports no self-injurious behavior  Patient reports no violent behavior    Verbal deficits: None    Patient's response to intervention:  The patient's response to intervention is accepting.    Progress toward goals and other mental status changes:  The patient's progress toward goals is fair .    Diagnosis:     ICD-10-CM ICD-9-CM   1. Severe episode of recurrent major depressive disorder, without psychotic features  F33.2 296.33   2. PMDD (premenstrual dysphoric disorder)  F32.81 625.4   3. Attention deficit hyperactivity disorder (ADHD), predominantly inattentive type  F90.0 314.00   4. JEANETTE (generalized anxiety disorder)  F41.1 300.02   5. PTSD (post-traumatic stress disorder)  F43.10 309.81       Plan:  individual psychotherapy    Return to clinic: as scheduled    Length of Service (minutes): 60

## 2025-07-29 ENCOUNTER — PATIENT MESSAGE (OUTPATIENT)
Dept: PSYCHIATRY | Facility: CLINIC | Age: 20
End: 2025-07-29
Payer: COMMERCIAL

## 2025-08-15 ENCOUNTER — OFFICE VISIT (OUTPATIENT)
Dept: INTERNAL MEDICINE | Facility: CLINIC | Age: 20
End: 2025-08-15
Payer: COMMERCIAL

## 2025-08-15 DIAGNOSIS — G43.001 MIGRAINE WITHOUT AURA AND WITH STATUS MIGRAINOSUS, NOT INTRACTABLE: ICD-10-CM

## 2025-08-15 DIAGNOSIS — R19.7 DIARRHEA, UNSPECIFIED TYPE: ICD-10-CM

## 2025-08-15 DIAGNOSIS — F33.2 SEVERE EPISODE OF RECURRENT MAJOR DEPRESSIVE DISORDER, WITHOUT PSYCHOTIC FEATURES: ICD-10-CM

## 2025-08-15 DIAGNOSIS — M25.40 JOINT SWELLING: Primary | ICD-10-CM

## 2025-08-15 DIAGNOSIS — F41.1 GENERALIZED ANXIETY DISORDER: ICD-10-CM

## 2025-08-15 DIAGNOSIS — F32.81 PMDD (PREMENSTRUAL DYSPHORIC DISORDER): ICD-10-CM

## 2025-08-15 DIAGNOSIS — M79.10 MYALGIA: ICD-10-CM

## 2025-08-15 DIAGNOSIS — M25.50 ARTHRALGIA, UNSPECIFIED JOINT: ICD-10-CM

## 2025-08-15 DIAGNOSIS — K59.00 CONSTIPATION, UNSPECIFIED CONSTIPATION TYPE: ICD-10-CM

## 2025-08-15 DIAGNOSIS — F43.10 PTSD (POST-TRAUMATIC STRESS DISORDER): ICD-10-CM

## 2025-08-15 RX ORDER — MELOXICAM 7.5 MG/1
7.5 TABLET ORAL DAILY
Qty: 30 TABLET | Refills: 1 | Status: SHIPPED | OUTPATIENT
Start: 2025-08-15

## 2025-08-18 ENCOUNTER — TELEPHONE (OUTPATIENT)
Dept: INTERNAL MEDICINE | Facility: CLINIC | Age: 20
End: 2025-08-18
Payer: COMMERCIAL

## 2025-08-25 ENCOUNTER — OFFICE VISIT (OUTPATIENT)
Dept: PSYCHIATRY | Facility: CLINIC | Age: 20
End: 2025-08-25
Payer: COMMERCIAL

## 2025-08-25 DIAGNOSIS — F41.1 GAD (GENERALIZED ANXIETY DISORDER): ICD-10-CM

## 2025-08-25 DIAGNOSIS — F41.0 PANIC ATTACKS: ICD-10-CM

## 2025-08-25 DIAGNOSIS — F33.1 MODERATE EPISODE OF RECURRENT MAJOR DEPRESSIVE DISORDER: ICD-10-CM

## 2025-08-25 DIAGNOSIS — Z79.899 MEDICATION MANAGEMENT: Primary | ICD-10-CM

## 2025-08-25 DIAGNOSIS — G47.00 INSOMNIA, UNSPECIFIED TYPE: ICD-10-CM

## 2025-08-25 PROCEDURE — 98006 SYNCH AUDIO-VIDEO EST MOD 30: CPT | Mod: 95,,, | Performed by: NURSE PRACTITIONER

## 2025-08-25 PROCEDURE — G2211 COMPLEX E/M VISIT ADD ON: HCPCS | Mod: 95,,, | Performed by: NURSE PRACTITIONER

## 2025-08-25 PROCEDURE — 90833 PSYTX W PT W E/M 30 MIN: CPT | Mod: 95,,, | Performed by: NURSE PRACTITIONER
